# Patient Record
Sex: MALE | Race: WHITE | NOT HISPANIC OR LATINO | Employment: UNEMPLOYED | URBAN - NONMETROPOLITAN AREA
[De-identification: names, ages, dates, MRNs, and addresses within clinical notes are randomized per-mention and may not be internally consistent; named-entity substitution may affect disease eponyms.]

---

## 2020-06-22 ENCOUNTER — OFFICE VISIT (OUTPATIENT)
Dept: URGENT CARE | Facility: PHYSICIAN GROUP | Age: 35
End: 2020-06-22

## 2020-06-22 VITALS
DIASTOLIC BLOOD PRESSURE: 68 MMHG | TEMPERATURE: 99.1 F | HEIGHT: 75 IN | RESPIRATION RATE: 16 BRPM | WEIGHT: 152 LBS | SYSTOLIC BLOOD PRESSURE: 114 MMHG | BODY MASS INDEX: 18.9 KG/M2 | OXYGEN SATURATION: 98 % | HEART RATE: 72 BPM

## 2020-06-22 DIAGNOSIS — L02.11 ABSCESS OF NECK: ICD-10-CM

## 2020-06-22 PROCEDURE — 99203 OFFICE O/P NEW LOW 30 MIN: CPT | Performed by: NURSE PRACTITIONER

## 2020-06-22 ASSESSMENT — ENCOUNTER SYMPTOMS
SENSORY CHANGE: 0
VOMITING: 0
NAUSEA: 0
TINGLING: 0
CHILLS: 0
HEADACHES: 0
FEVER: 0

## 2020-06-22 NOTE — PROGRESS NOTES
Subjective:     Sudeep West  is a 34 y.o. male who presents for Cyst       Cyst   This is a new problem. The current episode started 1 to 4 weeks ago. The problem occurs constantly. The problem has been gradually worsening. Pertinent negatives include no chills, fever, headaches, nausea, rash or vomiting. Associated symptoms comments: 34-year-old male patient reports urgent care for new problem.  States that he is noticed a cyst on the back of his neck on the left-hand side getting worse over the last 30 days.  Patient states he has had slight pain due to the swelling but denies fever, chills, nausea or vomiting.  Patient states he did have this about 10 years ago but is unsure if it is in the same area or not.  Has not tried to pop it on its own or taken anything over-the-counter for symptoms.. Nothing aggravates the symptoms. He has tried nothing for the symptoms.     Review of Systems   Constitutional: Negative for chills, fever and malaise/fatigue.   Gastrointestinal: Negative for nausea and vomiting.   Skin: Negative for itching and rash.   Neurological: Negative for tingling, sensory change and headaches.     History reviewed. No pertinent past medical history. History reviewed. No pertinent surgical history.   Social History     Socioeconomic History   • Marital status: Single     Spouse name: Not on file   • Number of children: Not on file   • Years of education: Not on file   • Highest education level: Not on file   Occupational History   • Not on file   Social Needs   • Financial resource strain: Not on file   • Food insecurity     Worry: Not on file     Inability: Not on file   • Transportation needs     Medical: Not on file     Non-medical: Not on file   Tobacco Use   • Smoking status: Never Smoker   • Smokeless tobacco: Never Used   Substance and Sexual Activity   • Alcohol use: Not on file   • Drug use: Not on file   • Sexual activity: Not on file   Lifestyle   • Physical activity     Days per week:  "Not on file     Minutes per session: Not on file   • Stress: Not on file   Relationships   • Social connections     Talks on phone: Not on file     Gets together: Not on file     Attends Restorationist service: Not on file     Active member of club or organization: Not on file     Attends meetings of clubs or organizations: Not on file     Relationship status: Not on file   • Intimate partner violence     Fear of current or ex partner: Not on file     Emotionally abused: Not on file     Physically abused: Not on file     Forced sexual activity: Not on file   Other Topics Concern   • Not on file   Social History Narrative   • Not on file    Sulfa drugs     Objective:   /68 (BP Location: Right arm, Patient Position: Sitting, BP Cuff Size: Adult)   Pulse 72   Temp 37.3 °C (99.1 °F) (Temporal)   Resp 16   Ht 1.905 m (6' 3\")   Wt 68.9 kg (152 lb)   SpO2 98%   BMI 19.00 kg/m²   Physical Exam  Vitals signs reviewed.   Constitutional:       Appearance: Normal appearance.   Neck:     Cardiovascular:      Rate and Rhythm: Normal rate and regular rhythm.      Heart sounds: Normal heart sounds.   Pulmonary:      Effort: Pulmonary effort is normal.      Breath sounds: Normal breath sounds.   Neurological:      Mental Status: He is alert and oriented to person, place, and time.   Psychiatric:         Mood and Affect: Mood normal.         Behavior: Behavior normal.         Thought Content: Thought content normal.         Judgment: Judgment normal.          Assessment/Plan:     1. Abscess of neck    Procedure: Incision and Drainage  -Risks, benefits, and alternatives discussed. Risks include infection, bleeding, nerve damage, and poor cosmetic outcome  -Clean technique with sterile instruments  -Local anesthesia with 2% lidocaine with epinephrine  -Incision with #11 blade into fluctuant area with purulent material expressed  -Cavity probed and any loculations bluntly taken down with hemostat  -Irrigated copiously with " "sterile saline  -Packed with 1/4\" gauze, dressed with telfa and adaptic  -Minimal bleeding with good hemostasis achieved  -The patient tolerated the procedure well    Supportive care, differential diagnoses, and indications for immediate follow-up discussed with patient.    Pathogenesis of diagnosis discussed including typical length and natural progression. Patient expresses understanding and agrees to plan.    Instructed patient to return to clinic for worsening symptoms or symptoms that persist for 7 to 10 days     Please note that this dictation was created using voice recognition software. I have made every reasonable attempt to correct obvious errors, but I expect that there are errors of grammar and possibly content that I did not discover before finalizing the note.          "

## 2020-07-07 ENCOUNTER — OFFICE VISIT (OUTPATIENT)
Dept: URGENT CARE | Facility: PHYSICIAN GROUP | Age: 35
End: 2020-07-07

## 2020-07-07 VITALS
HEIGHT: 75 IN | BODY MASS INDEX: 18.9 KG/M2 | TEMPERATURE: 98.4 F | HEART RATE: 76 BPM | SYSTOLIC BLOOD PRESSURE: 116 MMHG | DIASTOLIC BLOOD PRESSURE: 80 MMHG | WEIGHT: 152 LBS | OXYGEN SATURATION: 98 %

## 2020-07-07 DIAGNOSIS — L72.9 INFECTED CYST OF SKIN: ICD-10-CM

## 2020-07-07 DIAGNOSIS — L08.9 INFECTED CYST OF SKIN: ICD-10-CM

## 2020-07-07 PROCEDURE — 99214 OFFICE O/P EST MOD 30 MIN: CPT | Performed by: PHYSICIAN ASSISTANT

## 2020-07-07 RX ORDER — DOXYCYCLINE HYCLATE 100 MG
100 TABLET ORAL 2 TIMES DAILY
Qty: 14 TAB | Refills: 0 | Status: SHIPPED | OUTPATIENT
Start: 2020-07-07 | End: 2020-07-14

## 2020-07-07 NOTE — PROGRESS NOTES
"Chief Complaint   Patient presents with   • Cyst     x was seen 06/22 / possible infection       HISTORY OF PRESENT ILLNESS: Patient is a 35 y.o. male who presents today for the following:    Patient is here for reevaluation of a cyst that was I&D 6/22.  He continues to have drainage that is malodorous.  He denies fever and pain.  He does report history of the same in the same location, approximately 10 years ago.    There are no active problems to display for this patient.      Allergies:Sulfa drugs    Current Outpatient Medications Ordered in Epic   Medication Sig Dispense Refill   • doxycycline (VIBRAMYCIN) 100 MG Tab Take 1 Tab by mouth 2 times a day for 7 days. 14 Tab 0     No current Epic-ordered facility-administered medications on file.        History reviewed. No pertinent past medical history.    Social History     Tobacco Use   • Smoking status: Never Smoker   • Smokeless tobacco: Never Used   Substance Use Topics   • Alcohol use: Not on file   • Drug use: Not on file       No family status information on file.   History reviewed. No pertinent family history.    Review of Systems:   Constitutional ROS: No unexpected change in weight, No weakness, No fatigue  Pulmonary ROS: No chronic cough, sputum, or hemoptysis, No dyspnea on exertion, No wheezing  Cardiovascular ROS: No diaphoresis, No edema, No palpitations  Hematologic/Lymphatic ROS: No chills, No night sweats, No weight loss  Skin/Integumentary ROS: Positive for cyst.      Exam:  /80   Pulse 76   Temp 36.9 °C (98.4 °F) (Temporal)   Ht 1.905 m (6' 3\")   Wt 68.9 kg (152 lb)   SpO2 98%   General: Well developed, well nourished. No distress.    HENT: Head is grossly normal.  Pulmonary: Unlabored respiratory effort.   Neurologic: Grossly nonfocal. No facial asymmetry noted.  Skin: Warm, dry, good turgor.  Incision still visible at the base of the neck, posterior aspect.  The area is nontender to palpation.  There is no erythema or drainage " noted.  Psych: Normal mood. Alert and oriented to person, place and time.    Assessment/Plan:  No signs of infection noted at this time.  Appears to be healing appropriately.  Provided patient with contingent antibiotics and discussed when to use these, trying to prevent repeat visit as patient does not have insurance.  1. Infected cyst of skin  doxycycline (VIBRAMYCIN) 100 MG Tab

## 2020-10-08 ENCOUNTER — OFFICE VISIT (OUTPATIENT)
Dept: MEDICAL GROUP | Facility: PHYSICIAN GROUP | Age: 35
End: 2020-10-08

## 2020-10-08 VITALS
WEIGHT: 143 LBS | BODY MASS INDEX: 17.78 KG/M2 | HEART RATE: 80 BPM | TEMPERATURE: 99 F | DIASTOLIC BLOOD PRESSURE: 60 MMHG | HEIGHT: 75 IN | OXYGEN SATURATION: 100 % | SYSTOLIC BLOOD PRESSURE: 98 MMHG

## 2020-10-08 DIAGNOSIS — R52 DIFFUSE PAIN: ICD-10-CM

## 2020-10-08 DIAGNOSIS — R93.5 ABNORMAL ABDOMINAL CT SCAN: ICD-10-CM

## 2020-10-08 DIAGNOSIS — R10.9 ABDOMINAL PAIN, UNSPECIFIED ABDOMINAL LOCATION: ICD-10-CM

## 2020-10-08 DIAGNOSIS — R11.0 NAUSEA: ICD-10-CM

## 2020-10-08 PROCEDURE — 99214 OFFICE O/P EST MOD 30 MIN: CPT | Performed by: NURSE PRACTITIONER

## 2020-10-08 RX ORDER — DICYCLOMINE HCL 20 MG
20 TABLET ORAL 2 TIMES DAILY
Qty: 60 TAB | Refills: 1 | Status: ON HOLD | OUTPATIENT
Start: 2020-10-08 | End: 2022-03-16

## 2020-10-08 RX ORDER — HYDROCORTISONE ACETATE 25 MG/1
25 SUPPOSITORY RECTAL EVERY 12 HOURS
Qty: 14 SUPPOSITORY | Refills: 0 | Status: ON HOLD | OUTPATIENT
Start: 2020-10-08 | End: 2022-03-16

## 2020-10-08 RX ORDER — ONDANSETRON 4 MG/1
4 TABLET, FILM COATED ORAL EVERY 4 HOURS PRN
Qty: 20 TAB | Refills: 1 | Status: SHIPPED | OUTPATIENT
Start: 2020-10-08 | End: 2022-03-18

## 2020-10-08 ASSESSMENT — PATIENT HEALTH QUESTIONNAIRE - PHQ9: CLINICAL INTERPRETATION OF PHQ2 SCORE: 0

## 2020-10-08 NOTE — PROGRESS NOTES
"CC: Establish care, referral to GI, abdominal pain    HISTORY OF THE PRESENT ILLNESS: Patient is a 35 y.o. male. This pleasant patient is here today for evaluation and management of the following health problems.      Abdominal pain  Patient is 35-year-old male here to establish care with me today.  He reports abdominal pain, nausea, unintentional weight loss, hemorrhoids.  Onset 6 to 8 weeks ago.  Has been seen in [facility] twice, most recently on 9/15/2020.  He brings in full records for me to review, will scanned into chart.  First ER visit showed moderate constipation on abdominal x-ray.  Patient was prescribed stool softener, laxative, Anusol.  Reports that medication caused him to be very nauseated and have diarrhea, discontinued after 3 days.  Never did fill Anusol prescription as it was too expensive.  Patient return to the ER on 9/15/2020 for worsening nausea triggered by abdominal pain, severe abdominal pain.  CT of abdomen showed \"nonspecific wall thickening of the left colon.  Could relate to colitis.\"  Patient was instructed to follow-up with Dr. Melo for colonoscopy.  Patient has been unable to schedule as he needs a referral from primary care provider.  Since ER visits, patient has been managing by avoiding trigger foods and eating small amounts.  He finds if he eats large amounts the abdominal pain becomes severe.  He describes abdominal pain as being in lower and lateral regions of abdomen.  Denies epigastric pain.  Does have small amount of blood in stool which he believes is from a hemorrhoid.  Labs done in ER showed normal CMP, CBC, UA.  Patient has lost 10 pounds in the last 2 months.    Diffuse pain  Patient is 35-year-old male here to establish care with me today.  Reports history of diffuse body pain.  Gets flares since in adulthood.  Reports has previously been worked up for autoimmune disease that was negative.      Allergies: Sulfa drugs    Current Outpatient Medications Ordered in " Epic   Medication Sig Dispense Refill   • ondansetron (ZOFRAN) 4 MG Tab tablet Take 1 Tab by mouth every four hours as needed for Nausea/Vomiting. 20 Tab 1   • dicyclomine (BENTYL) 20 MG Tab Take 1 Tab by mouth 2 Times a Day. 60 Tab 1   • hydrocortisone (ANUSOL-HC) 25 MG Suppos Insert 1 Suppository in rectum every 12 hours. 14 Suppository 0     No current [facility]-ordered facility-administered medications on file.        Past Medical History:   Diagnosis Date   • Anemia        Past Surgical History:   Procedure Laterality Date   • EYE SURGERY      lasik and muscle repair as child       Social History     Tobacco Use   • Smoking status: Never Smoker   • Smokeless tobacco: Never Used   Substance Use Topics   • Alcohol use: Not on file   • Drug use: Not on file       Family History   Problem Relation Age of Onset   • Lung Cancer Mother    • Lung Disease Father    • Abdominal aortic aneurysm Father    • Migraines Sister    • Seizures Sister        ROS:     - Constitutional: Negative for fever, chills.  Positive unexpected weight change, and fatigue/generalized weakness.     - HEENT: Negative for vision changes, hearing changes, ear pain, rhinorrhea, sinus congestion, and sore throat.   Positive headaches.    - Respiratory: Negative for cough, dyspnea, and wheezing.      - Cardiovascular: Negative for chest pain, palpitations, orthopnea, and bilateral lower extremity edema.     - Gastrointestinal: Negative for heartburn, nausea, vomiting, abdominal pain, diarrhea.     - Genitourinary: As in HPI, otherwise negative for dysuria, polyuria, hematuria, pyuria, urinary urgency, and urinary incontinence.    - Musculoskeletal: Negative for myalgias.  Positive back pain, and joint pain.     - Skin: Negative for rash, itching, cyanotic skin color change.     - Neurological: Negative for tingling,  focal sensory deficit, focal weakness and headaches.  Positive dizziness and tremors.    - Endo/Heme/Allergies: Does not bruise/bleed  "easily.     - Psychiatric/Behavioral: Negative for depression, suicidal/homicidal ideation and memory loss.           Exam: BP (!) 98/60   Pulse 80   Temp 37.2 °C (99 °F) (Temporal)   Ht 1.905 m (6' 3\")   Wt 64.9 kg (143 lb)   SpO2 100%  Body mass index is 17.87 kg/m².    General: Alert, pleasant, ill-appearing, thin, male in NAD  HEENT: Normocephalic. Eyes conjunctiva clear lids without ptosis, pupils equal and reactive to light, ears normal shape and contour, canals are clear bilaterally, tympanic membranes are pearly gray with good light reflex, nasal mucosa without erythema and drainage, oropharynx is without erythema, edema or exudates.   Neck: Supple without bruit. Thyroid is not enlarged.  Pulmonary: Clear to ausculation.  Normal effort. No rales, ronchi, or wheezing.  Cardiovascular: Normal rate and rhythm without murmur. Carotid and radial pulses are intact and equal bilaterally.  No lower extremity edema.  Abdomen: Soft, moderate diffuse tenderness throughout, nondistended. Normal bowel sounds. Liver and spleen are not palpable.  Neurologic: Grossly nonfocal  Lymph: No cervical or supraclavicular lymph nodes are palpable  Skin: Warm and dry.  Pale.  Musculoskeletal: Normal gait.   Psych: Normal mood and affect. Alert and oriented. Judgment and insight is normal.    Please note that this dictation was created using voice recognition software. I have made every reasonable attempt to correct obvious errors, but I expect that there are errors of grammar and possibly content that I did not discover before finalizing the note.      Assessment/Plan  1. Nausea  Advised patient to use ondansetron sparingly only for severe nausea.  - ondansetron (ZOFRAN) 4 MG Tab tablet; Take 1 Tab by mouth every four hours as needed for Nausea/Vomiting.  Dispense: 20 Tab; Refill: 1    2. Abdominal pain, unspecified abdominal location  Patient continues to have abdominal pain, unintentional weight loss.  Likely has inflammatory " bowel disease, but may have irritable bowel syndrome.  Will refer to gastroenterology urgently.  In the meantime patient will trial dicyclomine to see if this helps with abdominal cramping.  Strict ER precautions reviewed with patient.  - REFERRAL TO GASTROENTEROLOGY  - dicyclomine (BENTYL) 20 MG Tab; Take 1 Tab by mouth 2 Times a Day.  Dispense: 60 Tab; Refill: 1    3. Abnormal abdominal CT scan    - REFERRAL TO GASTROENTEROLOGY    4. Diffuse pain  Continue to monitor.    Patient will return to clinic in the next few months.  Patient does have Medicaid insurance and has to pay cash when he comes to see me.  I did advise that we do have primary care providers in [place] in [place] who take Medicaid.  Patient will consider.

## 2020-10-09 PROBLEM — R52 DIFFUSE PAIN: Status: ACTIVE | Noted: 2020-10-09

## 2020-10-09 PROBLEM — R10.9 ABDOMINAL PAIN: Status: ACTIVE | Noted: 2020-10-09

## 2020-10-09 SDOH — HEALTH STABILITY: MENTAL HEALTH: HOW OFTEN DO YOU HAVE A DRINK CONTAINING ALCOHOL?: NEVER

## 2020-10-09 SDOH — HEALTH STABILITY: MENTAL HEALTH: HOW OFTEN DO YOU HAVE 6 OR MORE DRINKS ON ONE OCCASION?: NEVER

## 2020-10-09 NOTE — ASSESSMENT & PLAN NOTE
Patient is 35-year-old male here to establish care with me today.  Reports history of diffuse body pain.  Gets flares since in adulthood.  Reports has previously been worked up for autoimmune disease that was negative.

## 2021-11-29 DIAGNOSIS — R11.0 NAUSEA: ICD-10-CM

## 2021-12-01 RX ORDER — ONDANSETRON 4 MG/1
TABLET, FILM COATED ORAL
Qty: 4 TABLET | Refills: 0 | OUTPATIENT
Start: 2021-12-01

## 2022-03-11 ENCOUNTER — PRE-ADMISSION TESTING (OUTPATIENT)
Dept: ADMISSIONS | Facility: MEDICAL CENTER | Age: 37
End: 2022-03-11
Attending: COLON & RECTAL SURGERY
Payer: MEDICAID

## 2022-03-11 RX ORDER — HYOSCYAMINE SULFATE 0.125 MG
125 TABLET ORAL EVERY 4 HOURS PRN
COMMUNITY

## 2022-03-11 RX ORDER — OMEPRAZOLE 20 MG/1
20 CAPSULE, DELAYED RELEASE ORAL DAILY
COMMUNITY

## 2022-03-11 RX ORDER — GABAPENTIN 300 MG/1
300 CAPSULE ORAL
COMMUNITY

## 2022-03-14 ENCOUNTER — APPOINTMENT (OUTPATIENT)
Dept: ADMISSIONS | Facility: MEDICAL CENTER | Age: 37
End: 2022-03-14
Payer: MEDICAID

## 2022-03-16 ENCOUNTER — ANESTHESIA EVENT (OUTPATIENT)
Dept: SURGERY | Facility: MEDICAL CENTER | Age: 37
End: 2022-03-16
Payer: MEDICAID

## 2022-03-16 ENCOUNTER — ANESTHESIA (OUTPATIENT)
Dept: SURGERY | Facility: MEDICAL CENTER | Age: 37
End: 2022-03-16
Payer: MEDICAID

## 2022-03-16 ENCOUNTER — HOSPITAL ENCOUNTER (OUTPATIENT)
Facility: MEDICAL CENTER | Age: 37
End: 2022-03-17
Attending: COLON & RECTAL SURGERY | Admitting: COLON & RECTAL SURGERY
Payer: MEDICAID

## 2022-03-16 DIAGNOSIS — R11.0 NAUSEA: ICD-10-CM

## 2022-03-16 DIAGNOSIS — G89.18 POSTOPERATIVE PAIN: ICD-10-CM

## 2022-03-16 PROBLEM — K44.9 HIATAL HERNIA: Status: ACTIVE | Noted: 2022-03-16

## 2022-03-16 LAB
ANION GAP SERPL CALC-SCNC: 10 MMOL/L (ref 7–16)
BUN SERPL-MCNC: 11 MG/DL (ref 8–22)
CALCIUM SERPL-MCNC: 9.9 MG/DL (ref 8.5–10.5)
CHLORIDE SERPL-SCNC: 103 MMOL/L (ref 96–112)
CO2 SERPL-SCNC: 26 MMOL/L (ref 20–33)
CREAT SERPL-MCNC: 0.82 MG/DL (ref 0.5–1.4)
ERYTHROCYTE [DISTWIDTH] IN BLOOD BY AUTOMATED COUNT: 38.6 FL (ref 35.9–50)
EXTERNAL QUALITY CONTROL: NORMAL
GFR SERPLBLD CREATININE-BSD FMLA CKD-EPI: 117 ML/MIN/1.73 M 2
GLUCOSE SERPL-MCNC: 100 MG/DL (ref 65–99)
HCT VFR BLD AUTO: 41.2 % (ref 42–52)
HGB BLD-MCNC: 14 G/DL (ref 14–18)
MCH RBC QN AUTO: 30.3 PG (ref 27–33)
MCHC RBC AUTO-ENTMCNC: 34 G/DL (ref 33.7–35.3)
MCV RBC AUTO: 89.2 FL (ref 81.4–97.8)
PLATELET # BLD AUTO: 274 K/UL (ref 164–446)
PMV BLD AUTO: 9 FL (ref 9–12.9)
POTASSIUM SERPL-SCNC: 4 MMOL/L (ref 3.6–5.5)
RBC # BLD AUTO: 4.62 M/UL (ref 4.7–6.1)
SARS-COV+SARS-COV-2 AG RESP QL IA.RAPID: NEGATIVE
SODIUM SERPL-SCNC: 139 MMOL/L (ref 135–145)
WBC # BLD AUTO: 8 K/UL (ref 4.8–10.8)

## 2022-03-16 PROCEDURE — 501570 HCHG TROCAR, SEPARATOR: Performed by: COLON & RECTAL SURGERY

## 2022-03-16 PROCEDURE — 700111 HCHG RX REV CODE 636 W/ 250 OVERRIDE (IP): Performed by: STUDENT IN AN ORGANIZED HEALTH CARE EDUCATION/TRAINING PROGRAM

## 2022-03-16 PROCEDURE — 502000 HCHG MISC OR IMPLANTS RC 0278: Performed by: COLON & RECTAL SURGERY

## 2022-03-16 PROCEDURE — 700101 HCHG RX REV CODE 250: Performed by: PHYSICIAN ASSISTANT

## 2022-03-16 PROCEDURE — 501571 HCHG TROCAR, SEPARATOR 12X100: Performed by: COLON & RECTAL SURGERY

## 2022-03-16 PROCEDURE — 500522 HCHG ENDOSTITCH SUTURING DEVICE: Performed by: COLON & RECTAL SURGERY

## 2022-03-16 PROCEDURE — A9270 NON-COVERED ITEM OR SERVICE: HCPCS | Performed by: PHYSICIAN ASSISTANT

## 2022-03-16 PROCEDURE — 700111 HCHG RX REV CODE 636 W/ 250 OVERRIDE (IP): Performed by: PHYSICIAN ASSISTANT

## 2022-03-16 PROCEDURE — 96375 TX/PRO/DX INJ NEW DRUG ADDON: CPT | Mod: XU

## 2022-03-16 PROCEDURE — 700101 HCHG RX REV CODE 250: Performed by: STUDENT IN AN ORGANIZED HEALTH CARE EDUCATION/TRAINING PROGRAM

## 2022-03-16 PROCEDURE — 160002 HCHG RECOVERY MINUTES (STAT): Performed by: COLON & RECTAL SURGERY

## 2022-03-16 PROCEDURE — G0378 HOSPITAL OBSERVATION PER HR: HCPCS

## 2022-03-16 PROCEDURE — 700102 HCHG RX REV CODE 250 W/ 637 OVERRIDE(OP): Performed by: STUDENT IN AN ORGANIZED HEALTH CARE EDUCATION/TRAINING PROGRAM

## 2022-03-16 PROCEDURE — 87426 SARSCOV CORONAVIRUS AG IA: CPT | Performed by: COLON & RECTAL SURGERY

## 2022-03-16 PROCEDURE — 700105 HCHG RX REV CODE 258: Performed by: COLON & RECTAL SURGERY

## 2022-03-16 PROCEDURE — 500521 HCHG ENDOSTITCH LOAD UNIT: Performed by: COLON & RECTAL SURGERY

## 2022-03-16 PROCEDURE — 700102 HCHG RX REV CODE 250 W/ 637 OVERRIDE(OP): Performed by: PHYSICIAN ASSISTANT

## 2022-03-16 PROCEDURE — 96374 THER/PROPH/DIAG INJ IV PUSH: CPT | Mod: XU

## 2022-03-16 PROCEDURE — 160036 HCHG PACU - EA ADDL 30 MINS PHASE I: Performed by: COLON & RECTAL SURGERY

## 2022-03-16 PROCEDURE — 160041 HCHG SURGERY MINUTES - EA ADDL 1 MIN LEVEL 4: Performed by: COLON & RECTAL SURGERY

## 2022-03-16 PROCEDURE — 501838 HCHG SUTURE GENERAL: Performed by: COLON & RECTAL SURGERY

## 2022-03-16 PROCEDURE — 36415 COLL VENOUS BLD VENIPUNCTURE: CPT

## 2022-03-16 PROCEDURE — 160048 HCHG OR STATISTICAL LEVEL 1-5: Performed by: COLON & RECTAL SURGERY

## 2022-03-16 PROCEDURE — C1781 MESH (IMPLANTABLE): HCPCS | Performed by: COLON & RECTAL SURGERY

## 2022-03-16 PROCEDURE — 160035 HCHG PACU - 1ST 60 MINS PHASE I: Performed by: COLON & RECTAL SURGERY

## 2022-03-16 PROCEDURE — 85027 COMPLETE CBC AUTOMATED: CPT

## 2022-03-16 PROCEDURE — 160029 HCHG SURGERY MINUTES - 1ST 30 MINS LEVEL 4: Performed by: COLON & RECTAL SURGERY

## 2022-03-16 PROCEDURE — 80048 BASIC METABOLIC PNL TOTAL CA: CPT

## 2022-03-16 PROCEDURE — 700101 HCHG RX REV CODE 250: Performed by: COLON & RECTAL SURGERY

## 2022-03-16 PROCEDURE — 502240 HCHG MISC OR SUPPLY RC 0272: Performed by: COLON & RECTAL SURGERY

## 2022-03-16 PROCEDURE — A9270 NON-COVERED ITEM OR SERVICE: HCPCS | Performed by: STUDENT IN AN ORGANIZED HEALTH CARE EDUCATION/TRAINING PROGRAM

## 2022-03-16 PROCEDURE — 501497 HCHG SURGICLIP: Performed by: COLON & RECTAL SURGERY

## 2022-03-16 PROCEDURE — 501583 HCHG TROCAR, THRD CAN&SEAL 5X100: Performed by: COLON & RECTAL SURGERY

## 2022-03-16 PROCEDURE — 96376 TX/PRO/DX INJ SAME DRUG ADON: CPT

## 2022-03-16 PROCEDURE — 502570 HCHG PACK, GASTRIC BANDING: Performed by: COLON & RECTAL SURGERY

## 2022-03-16 PROCEDURE — 160009 HCHG ANES TIME/MIN: Performed by: COLON & RECTAL SURGERY

## 2022-03-16 DEVICE — MESH OVITEX 6 X 10CM RESORBABLE (1/EA): Type: IMPLANTABLE DEVICE | Site: ABDOMEN | Status: FUNCTIONAL

## 2022-03-16 RX ORDER — HYDROMORPHONE HYDROCHLORIDE 1 MG/ML
0.1 INJECTION, SOLUTION INTRAMUSCULAR; INTRAVENOUS; SUBCUTANEOUS
Status: DISCONTINUED | OUTPATIENT
Start: 2022-03-16 | End: 2022-03-16 | Stop reason: HOSPADM

## 2022-03-16 RX ORDER — HYDROMORPHONE HYDROCHLORIDE 1 MG/ML
0.4 INJECTION, SOLUTION INTRAMUSCULAR; INTRAVENOUS; SUBCUTANEOUS
Status: DISCONTINUED | OUTPATIENT
Start: 2022-03-16 | End: 2022-03-16 | Stop reason: HOSPADM

## 2022-03-16 RX ORDER — LIDOCAINE HYDROCHLORIDE 20 MG/ML
INJECTION, SOLUTION EPIDURAL; INFILTRATION; INTRACAUDAL; PERINEURAL PRN
Status: DISCONTINUED | OUTPATIENT
Start: 2022-03-16 | End: 2022-03-16 | Stop reason: SURG

## 2022-03-16 RX ORDER — ACETAMINOPHEN 500 MG
1000 TABLET ORAL EVERY 6 HOURS
Status: DISCONTINUED | OUTPATIENT
Start: 2022-03-16 | End: 2022-03-17 | Stop reason: HOSPADM

## 2022-03-16 RX ORDER — CEFAZOLIN SODIUM 1 G/3ML
INJECTION, POWDER, FOR SOLUTION INTRAMUSCULAR; INTRAVENOUS PRN
Status: DISCONTINUED | OUTPATIENT
Start: 2022-03-16 | End: 2022-03-16 | Stop reason: SURG

## 2022-03-16 RX ORDER — HYDROMORPHONE HYDROCHLORIDE 1 MG/ML
0.2 INJECTION, SOLUTION INTRAMUSCULAR; INTRAVENOUS; SUBCUTANEOUS
Status: DISCONTINUED | OUTPATIENT
Start: 2022-03-16 | End: 2022-03-16 | Stop reason: HOSPADM

## 2022-03-16 RX ORDER — HYDRALAZINE HYDROCHLORIDE 20 MG/ML
5 INJECTION INTRAMUSCULAR; INTRAVENOUS
Status: DISCONTINUED | OUTPATIENT
Start: 2022-03-16 | End: 2022-03-16 | Stop reason: HOSPADM

## 2022-03-16 RX ORDER — DIPHENHYDRAMINE HYDROCHLORIDE 50 MG/ML
25 INJECTION INTRAMUSCULAR; INTRAVENOUS EVERY 6 HOURS PRN
Status: DISCONTINUED | OUTPATIENT
Start: 2022-03-16 | End: 2022-03-17 | Stop reason: HOSPADM

## 2022-03-16 RX ORDER — MIDAZOLAM HYDROCHLORIDE 1 MG/ML
1 INJECTION INTRAMUSCULAR; INTRAVENOUS
Status: DISCONTINUED | OUTPATIENT
Start: 2022-03-16 | End: 2022-03-16 | Stop reason: HOSPADM

## 2022-03-16 RX ORDER — SODIUM CHLORIDE, SODIUM LACTATE, POTASSIUM CHLORIDE, AND CALCIUM CHLORIDE .6; .31; .03; .02 G/100ML; G/100ML; G/100ML; G/100ML
500 INJECTION, SOLUTION INTRAVENOUS
Status: DISCONTINUED | OUTPATIENT
Start: 2022-03-16 | End: 2022-03-17 | Stop reason: HOSPADM

## 2022-03-16 RX ORDER — OXYCODONE HCL 5 MG/5 ML
5 SOLUTION, ORAL ORAL
Status: DISCONTINUED | OUTPATIENT
Start: 2022-03-16 | End: 2022-03-17 | Stop reason: HOSPADM

## 2022-03-16 RX ORDER — HYDROMORPHONE HYDROCHLORIDE 1 MG/ML
0.5 INJECTION, SOLUTION INTRAMUSCULAR; INTRAVENOUS; SUBCUTANEOUS
Status: DISCONTINUED | OUTPATIENT
Start: 2022-03-16 | End: 2022-03-17 | Stop reason: HOSPADM

## 2022-03-16 RX ORDER — SCOLOPAMINE TRANSDERMAL SYSTEM 1 MG/1
PATCH, EXTENDED RELEASE TRANSDERMAL
Status: DISPENSED
Start: 2022-03-16 | End: 2022-03-16

## 2022-03-16 RX ORDER — ENALAPRILAT 1.25 MG/ML
2.5 INJECTION INTRAVENOUS EVERY 6 HOURS PRN
Status: DISCONTINUED | OUTPATIENT
Start: 2022-03-16 | End: 2022-03-17 | Stop reason: HOSPADM

## 2022-03-16 RX ORDER — ESMOLOL HYDROCHLORIDE 10 MG/ML
INJECTION INTRAVENOUS PRN
Status: DISCONTINUED | OUTPATIENT
Start: 2022-03-16 | End: 2022-03-16 | Stop reason: SURG

## 2022-03-16 RX ORDER — CALCIUM CARBONATE 500 MG/1
500 TABLET, CHEWABLE ORAL
Status: DISCONTINUED | OUTPATIENT
Start: 2022-03-16 | End: 2022-03-17 | Stop reason: HOSPADM

## 2022-03-16 RX ORDER — MEPERIDINE HYDROCHLORIDE 25 MG/ML
12.5 INJECTION INTRAMUSCULAR; INTRAVENOUS; SUBCUTANEOUS
Status: DISCONTINUED | OUTPATIENT
Start: 2022-03-16 | End: 2022-03-16 | Stop reason: HOSPADM

## 2022-03-16 RX ORDER — OXYCODONE HCL 5 MG/5 ML
5 SOLUTION, ORAL ORAL
Status: COMPLETED | OUTPATIENT
Start: 2022-03-16 | End: 2022-03-16

## 2022-03-16 RX ORDER — ONDANSETRON 2 MG/ML
4 INJECTION INTRAMUSCULAR; INTRAVENOUS
Status: DISCONTINUED | OUTPATIENT
Start: 2022-03-16 | End: 2022-03-16 | Stop reason: HOSPADM

## 2022-03-16 RX ORDER — PROMETHAZINE HYDROCHLORIDE 25 MG/1
25 SUPPOSITORY RECTAL EVERY 4 HOURS PRN
Status: DISCONTINUED | OUTPATIENT
Start: 2022-03-16 | End: 2022-03-17 | Stop reason: HOSPADM

## 2022-03-16 RX ORDER — LIDOCAINE HYDROCHLORIDE 40 MG/ML
SOLUTION TOPICAL PRN
Status: DISCONTINUED | OUTPATIENT
Start: 2022-03-16 | End: 2022-03-16 | Stop reason: SURG

## 2022-03-16 RX ORDER — METOPROLOL TARTRATE 1 MG/ML
1 INJECTION, SOLUTION INTRAVENOUS
Status: DISCONTINUED | OUTPATIENT
Start: 2022-03-16 | End: 2022-03-16 | Stop reason: HOSPADM

## 2022-03-16 RX ORDER — DIPHENHYDRAMINE HYDROCHLORIDE 50 MG/ML
12.5 INJECTION INTRAMUSCULAR; INTRAVENOUS EVERY 6 HOURS PRN
Status: DISCONTINUED | OUTPATIENT
Start: 2022-03-16 | End: 2022-03-17 | Stop reason: HOSPADM

## 2022-03-16 RX ORDER — HALOPERIDOL 5 MG/ML
1 INJECTION INTRAMUSCULAR
Status: DISCONTINUED | OUTPATIENT
Start: 2022-03-16 | End: 2022-03-16 | Stop reason: HOSPADM

## 2022-03-16 RX ORDER — SODIUM CHLORIDE AND POTASSIUM CHLORIDE 150; 900 MG/100ML; MG/100ML
INJECTION, SOLUTION INTRAVENOUS CONTINUOUS
Status: DISCONTINUED | OUTPATIENT
Start: 2022-03-16 | End: 2022-03-17 | Stop reason: HOSPADM

## 2022-03-16 RX ORDER — OXYCODONE HCL 5 MG/5 ML
10 SOLUTION, ORAL ORAL
Status: COMPLETED | OUTPATIENT
Start: 2022-03-16 | End: 2022-03-16

## 2022-03-16 RX ORDER — OXYCODONE HCL 5 MG/5 ML
10 SOLUTION, ORAL ORAL
Status: DISCONTINUED | OUTPATIENT
Start: 2022-03-16 | End: 2022-03-17 | Stop reason: HOSPADM

## 2022-03-16 RX ORDER — DIPHENHYDRAMINE HCL 25 MG
25 TABLET ORAL EVERY 6 HOURS PRN
Status: DISCONTINUED | OUTPATIENT
Start: 2022-03-16 | End: 2022-03-17 | Stop reason: HOSPADM

## 2022-03-16 RX ORDER — ACETAMINOPHEN 500 MG
1000 TABLET ORAL EVERY 6 HOURS PRN
Status: DISCONTINUED | OUTPATIENT
Start: 2022-03-21 | End: 2022-03-17 | Stop reason: HOSPADM

## 2022-03-16 RX ORDER — HALOPERIDOL 5 MG/ML
1 INJECTION INTRAMUSCULAR EVERY 6 HOURS PRN
Status: DISCONTINUED | OUTPATIENT
Start: 2022-03-16 | End: 2022-03-17 | Stop reason: HOSPADM

## 2022-03-16 RX ORDER — LABETALOL HYDROCHLORIDE 5 MG/ML
5 INJECTION, SOLUTION INTRAVENOUS
Status: DISCONTINUED | OUTPATIENT
Start: 2022-03-16 | End: 2022-03-16 | Stop reason: HOSPADM

## 2022-03-16 RX ORDER — SODIUM CHLORIDE, SODIUM LACTATE, POTASSIUM CHLORIDE, CALCIUM CHLORIDE 600; 310; 30; 20 MG/100ML; MG/100ML; MG/100ML; MG/100ML
INJECTION, SOLUTION INTRAVENOUS CONTINUOUS
Status: ACTIVE | OUTPATIENT
Start: 2022-03-16 | End: 2022-03-16

## 2022-03-16 RX ORDER — BUPIVACAINE HYDROCHLORIDE AND EPINEPHRINE 5; 5 MG/ML; UG/ML
INJECTION, SOLUTION EPIDURAL; INTRACAUDAL; PERINEURAL
Status: DISCONTINUED | OUTPATIENT
Start: 2022-03-16 | End: 2022-03-16 | Stop reason: HOSPADM

## 2022-03-16 RX ORDER — ONDANSETRON 2 MG/ML
4 INJECTION INTRAMUSCULAR; INTRAVENOUS EVERY 4 HOURS PRN
Status: DISCONTINUED | OUTPATIENT
Start: 2022-03-16 | End: 2022-03-17 | Stop reason: HOSPADM

## 2022-03-16 RX ORDER — DEXAMETHASONE SODIUM PHOSPHATE 4 MG/ML
INJECTION, SOLUTION INTRA-ARTICULAR; INTRALESIONAL; INTRAMUSCULAR; INTRAVENOUS; SOFT TISSUE PRN
Status: DISCONTINUED | OUTPATIENT
Start: 2022-03-16 | End: 2022-03-16 | Stop reason: SURG

## 2022-03-16 RX ORDER — METOCLOPRAMIDE HYDROCHLORIDE 5 MG/ML
INJECTION INTRAMUSCULAR; INTRAVENOUS PRN
Status: DISCONTINUED | OUTPATIENT
Start: 2022-03-16 | End: 2022-03-16 | Stop reason: SURG

## 2022-03-16 RX ORDER — HYOSCYAMINE SULFATE 0.12 MG/5ML
0.12 LIQUID ORAL EVERY 4 HOURS PRN
Status: DISCONTINUED | OUTPATIENT
Start: 2022-03-16 | End: 2022-03-17 | Stop reason: HOSPADM

## 2022-03-16 RX ORDER — SIMETHICONE 125 MG
125 TABLET,CHEWABLE ORAL 3 TIMES DAILY PRN
Status: DISCONTINUED | OUTPATIENT
Start: 2022-03-16 | End: 2022-03-17 | Stop reason: HOSPADM

## 2022-03-16 RX ORDER — HYDROMORPHONE HYDROCHLORIDE 2 MG/ML
INJECTION, SOLUTION INTRAMUSCULAR; INTRAVENOUS; SUBCUTANEOUS PRN
Status: DISCONTINUED | OUTPATIENT
Start: 2022-03-16 | End: 2022-03-16 | Stop reason: SURG

## 2022-03-16 RX ORDER — DIPHENHYDRAMINE HYDROCHLORIDE 50 MG/ML
12.5 INJECTION INTRAMUSCULAR; INTRAVENOUS
Status: DISCONTINUED | OUTPATIENT
Start: 2022-03-16 | End: 2022-03-16 | Stop reason: HOSPADM

## 2022-03-16 RX ORDER — GABAPENTIN 300 MG/1
300 CAPSULE ORAL
Status: DISCONTINUED | OUTPATIENT
Start: 2022-03-16 | End: 2022-03-17 | Stop reason: HOSPADM

## 2022-03-16 RX ORDER — ONDANSETRON 2 MG/ML
INJECTION INTRAMUSCULAR; INTRAVENOUS PRN
Status: DISCONTINUED | OUTPATIENT
Start: 2022-03-16 | End: 2022-03-16 | Stop reason: SURG

## 2022-03-16 RX ADMIN — HYDROMORPHONE HYDROCHLORIDE 0.5 MG: 1 INJECTION, SOLUTION INTRAMUSCULAR; INTRAVENOUS; SUBCUTANEOUS at 20:32

## 2022-03-16 RX ADMIN — HYDROMORPHONE HYDROCHLORIDE 0.2 MG: 1 INJECTION, SOLUTION INTRAMUSCULAR; INTRAVENOUS; SUBCUTANEOUS at 12:53

## 2022-03-16 RX ADMIN — FENTANYL CITRATE 100 MCG: 50 INJECTION, SOLUTION INTRAMUSCULAR; INTRAVENOUS at 11:35

## 2022-03-16 RX ADMIN — LIDOCAINE HYDROCHLORIDE 4 ML: 40 SOLUTION TOPICAL at 11:37

## 2022-03-16 RX ADMIN — FENTANYL CITRATE 50 MCG: 50 INJECTION INTRAMUSCULAR; INTRAVENOUS at 12:46

## 2022-03-16 RX ADMIN — HYDROMORPHONE HYDROCHLORIDE 0.5 MG: 1 INJECTION, SOLUTION INTRAMUSCULAR; INTRAVENOUS; SUBCUTANEOUS at 14:10

## 2022-03-16 RX ADMIN — OXYCODONE HYDROCHLORIDE 10 MG: 5 SOLUTION ORAL at 12:48

## 2022-03-16 RX ADMIN — ESMOLOL HYDROCHLORIDE 20 MG: 100 INJECTION, SOLUTION INTRAVENOUS at 11:40

## 2022-03-16 RX ADMIN — METOCLOPRAMIDE 10 MG: 5 INJECTION, SOLUTION INTRAMUSCULAR; INTRAVENOUS at 11:39

## 2022-03-16 RX ADMIN — HYDROMORPHONE HYDROCHLORIDE 0.4 MG: 2 INJECTION INTRAMUSCULAR; INTRAVENOUS; SUBCUTANEOUS at 12:00

## 2022-03-16 RX ADMIN — FAMOTIDINE 20 MG: 10 INJECTION INTRAVENOUS at 17:29

## 2022-03-16 RX ADMIN — HYDROMORPHONE HYDROCHLORIDE 0.2 MG: 1 INJECTION, SOLUTION INTRAMUSCULAR; INTRAVENOUS; SUBCUTANEOUS at 13:00

## 2022-03-16 RX ADMIN — ACETAMINOPHEN 1000 MG: 500 TABLET ORAL at 23:42

## 2022-03-16 RX ADMIN — OXYCODONE HYDROCHLORIDE 10 MG: 5 SOLUTION ORAL at 23:42

## 2022-03-16 RX ADMIN — ONDANSETRON 4 MG: 2 INJECTION INTRAMUSCULAR; INTRAVENOUS at 14:11

## 2022-03-16 RX ADMIN — CEFAZOLIN 2 G: 330 INJECTION, POWDER, FOR SOLUTION INTRAMUSCULAR; INTRAVENOUS at 11:38

## 2022-03-16 RX ADMIN — FENTANYL CITRATE 50 MCG: 50 INJECTION INTRAMUSCULAR; INTRAVENOUS at 12:52

## 2022-03-16 RX ADMIN — LIDOCAINE HYDROCHLORIDE 60 MG: 20 INJECTION, SOLUTION EPIDURAL; INFILTRATION; INTRACAUDAL at 11:37

## 2022-03-16 RX ADMIN — ROCURONIUM BROMIDE 50 MG: 10 INJECTION, SOLUTION INTRAVENOUS at 11:37

## 2022-03-16 RX ADMIN — ONDANSETRON 4 MG: 2 INJECTION INTRAMUSCULAR; INTRAVENOUS at 11:49

## 2022-03-16 RX ADMIN — DIPHENHYDRAMINE HYDROCHLORIDE 25 MG: 50 INJECTION INTRAMUSCULAR; INTRAVENOUS at 19:33

## 2022-03-16 RX ADMIN — DEXAMETHASONE SODIUM PHOSPHATE 4 MG: 4 INJECTION, SOLUTION INTRA-ARTICULAR; INTRALESIONAL; INTRAMUSCULAR; INTRAVENOUS; SOFT TISSUE at 11:40

## 2022-03-16 RX ADMIN — HALOPERIDOL LACTATE 1 MG: 5 INJECTION, SOLUTION INTRAMUSCULAR at 12:34

## 2022-03-16 RX ADMIN — MEPERIDINE HYDROCHLORIDE 12.5 MG: 25 INJECTION INTRAMUSCULAR; INTRAVENOUS; SUBCUTANEOUS at 12:30

## 2022-03-16 RX ADMIN — POTASSIUM CHLORIDE AND SODIUM CHLORIDE: 900; 150 INJECTION, SOLUTION INTRAVENOUS at 14:11

## 2022-03-16 RX ADMIN — GABAPENTIN 300 MG: 300 CAPSULE ORAL at 21:03

## 2022-03-16 RX ADMIN — ACETAMINOPHEN 1000 MG: 500 TABLET ORAL at 14:10

## 2022-03-16 RX ADMIN — MIDAZOLAM 2 MG: 1 INJECTION INTRAMUSCULAR; INTRAVENOUS at 11:33

## 2022-03-16 RX ADMIN — SUGAMMADEX 200 MG: 100 INJECTION, SOLUTION INTRAVENOUS at 12:14

## 2022-03-16 RX ADMIN — SODIUM CHLORIDE, POTASSIUM CHLORIDE, SODIUM LACTATE AND CALCIUM CHLORIDE: 600; 310; 30; 20 INJECTION, SOLUTION INTRAVENOUS at 10:28

## 2022-03-16 RX ADMIN — HYDROMORPHONE HYDROCHLORIDE 0.5 MG: 1 INJECTION, SOLUTION INTRAMUSCULAR; INTRAVENOUS; SUBCUTANEOUS at 17:29

## 2022-03-16 ASSESSMENT — LIFESTYLE VARIABLES
AVERAGE NUMBER OF DAYS PER WEEK YOU HAVE A DRINK CONTAINING ALCOHOL: 0
HAVE YOU EVER FELT YOU SHOULD CUT DOWN ON YOUR DRINKING: NO
CONSUMPTION TOTAL: NEGATIVE
TOTAL SCORE: 0
TOTAL SCORE: 0
ALCOHOL_USE: NO
HAVE PEOPLE ANNOYED YOU BY CRITICIZING YOUR DRINKING: NO
EVER HAD A DRINK FIRST THING IN THE MORNING TO STEADY YOUR NERVES TO GET RID OF A HANGOVER: NO
ON A TYPICAL DAY WHEN YOU DRINK ALCOHOL HOW MANY DRINKS DO YOU HAVE: 0
HOW MANY TIMES IN THE PAST YEAR HAVE YOU HAD 5 OR MORE DRINKS IN A DAY: 0
TOTAL SCORE: 0
EVER FELT BAD OR GUILTY ABOUT YOUR DRINKING: NO

## 2022-03-16 ASSESSMENT — COGNITIVE AND FUNCTIONAL STATUS - GENERAL
STANDING UP FROM CHAIR USING ARMS: A LITTLE
DAILY ACTIVITIY SCORE: 23
SUGGESTED CMS G CODE MODIFIER DAILY ACTIVITY: CI
MOVING FROM LYING ON BACK TO SITTING ON SIDE OF FLAT BED: A LITTLE
MOBILITY SCORE: 19
SUGGESTED CMS G CODE MODIFIER MOBILITY: CK
DRESSING REGULAR LOWER BODY CLOTHING: A LITTLE
CLIMB 3 TO 5 STEPS WITH RAILING: A LITTLE
MOVING TO AND FROM BED TO CHAIR: A LITTLE
WALKING IN HOSPITAL ROOM: A LITTLE

## 2022-03-16 ASSESSMENT — PATIENT HEALTH QUESTIONNAIRE - PHQ9
2. FEELING DOWN, DEPRESSED, IRRITABLE, OR HOPELESS: SEVERAL DAYS
6. FEELING BAD ABOUT YOURSELF - OR THAT YOU ARE A FAILURE OR HAVE LET YOURSELF OR YOUR FAMILY DOWN: NOT AL ALL
9. THOUGHTS THAT YOU WOULD BE BETTER OFF DEAD, OR OF HURTING YOURSELF: NOT AT ALL
1. LITTLE INTEREST OR PLEASURE IN DOING THINGS: SEVERAL DAYS
SUM OF ALL RESPONSES TO PHQ9 QUESTIONS 1 AND 2: 2
SUM OF ALL RESPONSES TO PHQ QUESTIONS 1-9: 5
7. TROUBLE CONCENTRATING ON THINGS, SUCH AS READING THE NEWSPAPER OR WATCHING TELEVISION: SEVERAL DAYS
5. POOR APPETITE OR OVEREATING: SEVERAL DAYS
8. MOVING OR SPEAKING SO SLOWLY THAT OTHER PEOPLE COULD HAVE NOTICED. OR THE OPPOSITE, BEING SO FIGETY OR RESTLESS THAT YOU HAVE BEEN MOVING AROUND A LOT MORE THAN USUAL: NOT AT ALL
3. TROUBLE FALLING OR STAYING ASLEEP OR SLEEPING TOO MUCH: NOT AT ALL
4. FEELING TIRED OR HAVING LITTLE ENERGY: SEVERAL DAYS

## 2022-03-16 ASSESSMENT — PAIN DESCRIPTION - PAIN TYPE
TYPE: ACUTE PAIN
TYPE: SURGICAL PAIN
TYPE: ACUTE PAIN
TYPE: SURGICAL PAIN

## 2022-03-16 ASSESSMENT — COPD QUESTIONNAIRES
DURING THE PAST 4 WEEKS HOW MUCH DID YOU FEEL SHORT OF BREATH: NONE/LITTLE OF THE TIME
HAVE YOU SMOKED AT LEAST 100 CIGARETTES IN YOUR ENTIRE LIFE: NO/DON'T KNOW
DO YOU EVER COUGH UP ANY MUCUS OR PHLEGM?: NO/ONLY WITH OCCASIONAL COLDS OR INFECTIONS
COPD SCREENING SCORE: 0

## 2022-03-16 ASSESSMENT — PAIN SCALES - GENERAL: PAIN_LEVEL: 10

## 2022-03-16 NOTE — ANESTHESIA PROCEDURE NOTES
Airway    Date/Time: 3/16/2022 11:38 AM  Performed by: Bob Engle M.D.  Authorized by: Bob Engle M.D.     Location:  OR  Urgency:  Elective  Indications for Airway Management:  Anesthesia      Spontaneous Ventilation: absent    Sedation Level:  Deep  Preoxygenated: Yes    Patient Position:  Sniffing  Final Airway Type:  Endotracheal airway  Final Endotracheal Airway:  ETT  Cuffed: Yes    Technique Used for Successful ETT Placement:  Direct laryngoscopy    Insertion Site:  Oral  Blade Type:  Holliday  Laryngoscope Blade/Videolaryngoscope Blade Size:  3  ETT Size (mm):  9.0  Leak Pressue (cm H2O):  20  Measured from:  Teeth  ETT to Teeth (cm):  25  Placement Verified by: auscultation and capnometry    Cormack-Lehane Classification:  Grade I - full view of glottis  Number of Attempts at Approach:  1  Ventilation Between Attempts:  None  Number of Other Approaches Attempted:  0

## 2022-03-16 NOTE — OR NURSING
Awake, alert and tolerating PO fluids.  Medicated for pain.  Vitals stable.  On monitors with alarms audible.    Called family member with update left voicemail.

## 2022-03-16 NOTE — ANESTHESIA POSTPROCEDURE EVALUATION
Patient: Sudeep West    Procedure Summary     Date: 03/16/22 Room / Location: [place] [id number] / SURGERY [facility]    Anesthesia Start: 1132 Anesthesia Stop: 1226    Procedure: FUNDOPLICATION, NISSEN, LAPAROSCOPIC (Abdomen) Diagnosis: (HIATAL HERNIA)    Surgeons: Surendra Carreon M.D. Responsible Provider: Bob Engle M.D.    Anesthesia Type: general ASA Status: 3          Final Anesthesia Type: general  Last vitals  BP   Blood Pressure: 120/67    Temp   37.1 °C (98.7 °F)    Pulse   100   Resp   16    SpO2   97 %      Anesthesia Post Evaluation    Patient location during evaluation: PACU  Patient participation: complete - patient participated  Level of consciousness: awake and alert  Pain score: 10    Airway patency: patent  Anesthetic complications: no  Cardiovascular status: hemodynamically stable  Respiratory status: acceptable  Hydration status: euvolemic    PONV: none          No complications documented.     Nurse Pain Score: 10 (NPRS)

## 2022-03-16 NOTE — OP REPORT
NAME:  Sudeep West  MRN:  [id number]  :  1985      DATE OF OPERATION: 3/16/2022    PREOPERATIVE DIAGNOSIS:  Paraesophageal hiatal hernia    POSTOPERATIVE DIAGNOSIS: Paraesophageal hiatal hernia     OPERATION PERFORMED:   1. Laparoscopic reduction of incarcerated paraesophageal gastric herniation.   2. Hiatal hernia repair with xenograft placement.   3. Laparoscopic Nissen fundoplication.    SURGEON: Surendra Carreon MD    ASSISTANT:  Kmaila Borges PA-C, PA-C    ANESTHESIOLOGIST:  Anesthesiologist: Bob Engle M.D.    ANESTHESIA: General endotracheal anesthesia.     SPECIMEN: none    ESTIMATED BLOOD LOSS: <10cc.     INDICATIONS: The patient is a 36 y.o. male with a diagnosis of hiatal hernia with severe symptoms. He comes today for surgical repair.  He is taken to the operating room today for Laparoscopic Nissen Fundoplication.    DETAILS OF PROCEDURE: After an extensive informed consent discussion process, the patient was brought to the operating room. He was placed in the supine position on the operating table. After induction of general anesthesia and placement of an endotracheal tube, the abdomen was prepped and draped in the usual sterile fashion. After administration of intravenous antibiotics, a bladeless optical entry trocar was carefully inserted into the abdomen. Pneumoperitoneum was established in the usual fashion. A bladeless 5 mm separator trocar was introduced. The laparoscope was introduced. Three additional separator trocars were placed in the upper abdomen and a 5 mm epigastric Tess-type liver retractor was placed to elevate the left lateral segment of the liver,   it was secured to the patient's right side with a robot arm.     Careful examination demonstrated a near complete intrathoracic stomach and   omentum and a large hiatal hernia. The table was placed in reverse Trendelenburg position and gradually the stomach and omentum were reduced. The attachments were slowly divided  with the Enseal device and the hernia sac was gradually freed allowing us to fully reduce the stomach. There was no esophageal shortening. The left and   right crura were well exposed circumferentially. We began with posterior crural approximating sutures using 0 Ethibond endo-stitches. A series of anterior crural approximating sutures were also placed in a similar fashion. A 42-Amharic blunt tipped bougie was passed down well into the stomach.     A resorbable 6x10cm telebio graft was soaked in saline solution and then a fenestrated V-cut, so that it would reside nicely around the gastroesophageal junction. It was laparoscopically placed and maneuvered into position, so as to help reinforce the crural closure. The graft was sutured to the crural closure with Polysorb Endo stitches with the rough regenerative side facing toward the muscle closure and the smooth side facing toward the gastric serosa. The greater curvature attachments and all the short gastric vessels were all   divided with the LigaSure device. We had nice mobility of the greater curve of the stomach, which was well vascularized and easily passed around posterior to the gastroesophageal junction region. At this time, the fundoplication was now created. A very loose floppy Nissen fundoplication was created over the bougie and loose seromuscular sutures were used to approximate it anteriorly as well as some anchoring sutures from the wrap to the graft and crura.     After final inspections demonstrated a nice result with excellent hemostasis and floppy comfortable wrap, the bougie was removed. The liver retractor was then removed. The pneumoperitoneum was allowed to escape. The ports were removed under direct vision. The port sites were irrigated and closed with Vicryl sutures. Steri-Strips and sterile dressings were applied.    The patient tolerated the procedure well and there were no apparent complications. All sponge, needle, and instrument counts  were correct on 2 separate occasions. He was awakened, extubated, and transferred to the recovery room in satisfactory condition.       ____________________________________   Surendra Carreon MD  DD: 3/16/2022  1:42 PM    CC:  [facility];

## 2022-03-16 NOTE — ANESTHESIA PREPROCEDURE EVALUATION
Case: [id number] Date/Time: 03/16/22 1215    Procedure: FUNDOPLICATION, NISSEN, LAPAROSCOPIC    Pre-op diagnosis: HIATAL HERNIA    Location: [facility] OR 10 / SURGERY [facility]    Surgeons: Surendra Carreon M.D.          Relevant Problems   No relevant active problems       Physical Exam    Airway   Mallampati: II  TM distance: >3 FB  Neck ROM: full       Cardiovascular - normal exam  Rhythm: regular  Rate: normal  (-) murmur     Dental - normal exam           Pulmonary - normal exam  Breath sounds clear to auscultation     Abdominal   (+) scaphoid    Comments: Cachectic     Neurological - normal exam               cachectic  Anesthesia Plan    ASA 3       Plan - general       Airway plan will be ETT          Induction: intravenous    Postoperative Plan: Postoperative administration of opioids is intended.    Pertinent diagnostic labs and testing reviewed    Informed Consent:    Anesthetic plan and risks discussed with patient.    Use of blood products discussed with: patient whom consented to blood products.

## 2022-03-16 NOTE — PROGRESS NOTES
4 Eyes Skin Assessment Completed by TONG Chiu and TONG Goldsmith.    Head WDL  Ears WDL  Nose WDL  Mouth WDL  Neck WDL  Breast/Chest WDL  Shoulder Blades WDL  Spine WDL  (R) Arm/Elbow/Hand WDL  (L) Arm/Elbow/Hand WDL  Abdomen Incision  Groin WDL  Scrotum/Coccyx/Buttocks WDL  (R) Leg WDL  (L) Leg WDL  (R) Heel/Foot/Toe WDL  (L) Heel/Foot/Toe WDL          Devices In Places Pulse Ox      Interventions In Place Gray Ear Foams    Possible Skin Injury No    Pictures Uploaded Into Epic N/A  Wound Consult Placed N/A  RN Wound Prevention Protocol Ordered No   4

## 2022-03-16 NOTE — ANESTHESIA TIME REPORT
Anesthesia Start and Stop Event Times     Date Time Event    3/16/2022 1126 Ready for Procedure     1132 Anesthesia Start     1226 Anesthesia Stop        Responsible Staff  03/16/22    Name Role Begin End    Bob Engle M.D. Anesth 1132 1226        Preop Diagnosis (Free Text):  Pre-op Diagnosis     HIATAL HERNIA        Preop Diagnosis (Codes):    Premium Reason  Non-Premium    Comments:

## 2022-03-17 VITALS
SYSTOLIC BLOOD PRESSURE: 111 MMHG | WEIGHT: 133 LBS | TEMPERATURE: 98.9 F | HEIGHT: 75 IN | DIASTOLIC BLOOD PRESSURE: 75 MMHG | OXYGEN SATURATION: 94 % | HEART RATE: 80 BPM | BODY MASS INDEX: 16.54 KG/M2 | RESPIRATION RATE: 18 BRPM

## 2022-03-17 LAB
ALBUMIN SERPL BCP-MCNC: 4 G/DL (ref 3.2–4.9)
ALBUMIN/GLOB SERPL: 1.7 G/DL
ALP SERPL-CCNC: 51 U/L (ref 30–99)
ALT SERPL-CCNC: 12 U/L (ref 2–50)
ANION GAP SERPL CALC-SCNC: 14 MMOL/L (ref 7–16)
AST SERPL-CCNC: 24 U/L (ref 12–45)
BILIRUB SERPL-MCNC: 1.1 MG/DL (ref 0.1–1.5)
BUN SERPL-MCNC: 12 MG/DL (ref 8–22)
CALCIUM SERPL-MCNC: 9 MG/DL (ref 8.5–10.5)
CHLORIDE SERPL-SCNC: 103 MMOL/L (ref 96–112)
CO2 SERPL-SCNC: 22 MMOL/L (ref 20–33)
CREAT SERPL-MCNC: 0.83 MG/DL (ref 0.5–1.4)
ERYTHROCYTE [DISTWIDTH] IN BLOOD BY AUTOMATED COUNT: 38.4 FL (ref 35.9–50)
GFR SERPLBLD CREATININE-BSD FMLA CKD-EPI: 117 ML/MIN/1.73 M 2
GLOBULIN SER CALC-MCNC: 2.3 G/DL (ref 1.9–3.5)
GLUCOSE SERPL-MCNC: 112 MG/DL (ref 65–99)
HCT VFR BLD AUTO: 37.2 % (ref 42–52)
HGB BLD-MCNC: 12.4 G/DL (ref 14–18)
MCH RBC QN AUTO: 30 PG (ref 27–33)
MCHC RBC AUTO-ENTMCNC: 33.3 G/DL (ref 33.7–35.3)
MCV RBC AUTO: 89.9 FL (ref 81.4–97.8)
PLATELET # BLD AUTO: 209 K/UL (ref 164–446)
PMV BLD AUTO: 8.7 FL (ref 9–12.9)
POTASSIUM SERPL-SCNC: 4.7 MMOL/L (ref 3.6–5.5)
PROT SERPL-MCNC: 6.3 G/DL (ref 6–8.2)
RBC # BLD AUTO: 4.14 M/UL (ref 4.7–6.1)
SODIUM SERPL-SCNC: 139 MMOL/L (ref 135–145)
WBC # BLD AUTO: 16.1 K/UL (ref 4.8–10.8)

## 2022-03-17 PROCEDURE — 700111 HCHG RX REV CODE 636 W/ 250 OVERRIDE (IP): Performed by: PHYSICIAN ASSISTANT

## 2022-03-17 PROCEDURE — 85027 COMPLETE CBC AUTOMATED: CPT

## 2022-03-17 PROCEDURE — 80053 COMPREHEN METABOLIC PANEL: CPT

## 2022-03-17 PROCEDURE — 96372 THER/PROPH/DIAG INJ SC/IM: CPT

## 2022-03-17 PROCEDURE — G0378 HOSPITAL OBSERVATION PER HR: HCPCS

## 2022-03-17 PROCEDURE — A9270 NON-COVERED ITEM OR SERVICE: HCPCS | Performed by: PHYSICIAN ASSISTANT

## 2022-03-17 PROCEDURE — 36415 COLL VENOUS BLD VENIPUNCTURE: CPT

## 2022-03-17 PROCEDURE — 700101 HCHG RX REV CODE 250: Performed by: PHYSICIAN ASSISTANT

## 2022-03-17 PROCEDURE — 96376 TX/PRO/DX INJ SAME DRUG ADON: CPT

## 2022-03-17 PROCEDURE — 700102 HCHG RX REV CODE 250 W/ 637 OVERRIDE(OP): Performed by: PHYSICIAN ASSISTANT

## 2022-03-17 RX ORDER — ONDANSETRON 4 MG/1
4 TABLET, ORALLY DISINTEGRATING ORAL EVERY 6 HOURS PRN
Qty: 20 TABLET | Refills: 0 | Status: ON HOLD | OUTPATIENT
Start: 2022-03-17 | End: 2022-04-08 | Stop reason: SDUPTHER

## 2022-03-17 RX ADMIN — POTASSIUM CHLORIDE AND SODIUM CHLORIDE: 900; 150 INJECTION, SOLUTION INTRAVENOUS at 00:35

## 2022-03-17 RX ADMIN — OXYCODONE HYDROCHLORIDE 10 MG: 5 SOLUTION ORAL at 02:44

## 2022-03-17 RX ADMIN — OXYCODONE HYDROCHLORIDE 10 MG: 5 SOLUTION ORAL at 06:29

## 2022-03-17 RX ADMIN — OXYCODONE HYDROCHLORIDE 10 MG: 5 SOLUTION ORAL at 09:35

## 2022-03-17 RX ADMIN — FAMOTIDINE 20 MG: 10 INJECTION INTRAVENOUS at 05:00

## 2022-03-17 RX ADMIN — ACETAMINOPHEN 1000 MG: 500 TABLET ORAL at 05:01

## 2022-03-17 RX ADMIN — ENOXAPARIN SODIUM 40 MG: 40 INJECTION SUBCUTANEOUS at 08:03

## 2022-03-17 RX ADMIN — DIPHENHYDRAMINE HYDROCHLORIDE 25 MG: 50 INJECTION INTRAMUSCULAR; INTRAVENOUS at 05:01

## 2022-03-17 ASSESSMENT — ENCOUNTER SYMPTOMS
PALPITATIONS: 0
HEARTBURN: 0
FEVER: 0
ABDOMINAL PAIN: 1
NAUSEA: 0
DIARRHEA: 0
COUGH: 0
VOMITING: 0
SHORTNESS OF BREATH: 0
CHILLS: 0

## 2022-03-17 ASSESSMENT — PAIN DESCRIPTION - PAIN TYPE
TYPE: ACUTE PAIN

## 2022-03-17 NOTE — DISCHARGE INSTRUCTIONS
Discharge Instructions    Discharged to home by car with relative. Discharged via wheelchair, hospital escort: Yes.  Special equipment needed: Not Applicable    Be sure to schedule a follow-up appointment with your primary care doctor or any specialists as instructed.     Discharge Plan:   Diet Plan: Discussed  Activity Level: Discussed  Confirmed Follow up Appointment: Patient to Call and Schedule Appointment  Confirmed Symptoms Management: Discussed  Medication Reconciliation Updated: Yes  Influenza Vaccine Indication: Patient Refuses    I understand that a diet low in cholesterol, fat, and sodium is recommended for good health. Unless I have been given specific instructions below for another diet, I accept this instruction as my diet prescription.   Other diet:     Special Instructions: None    · Is patient discharged on Warfarin / Coumadin? No   Pain Management after Surgery, Injury or Illness    What should I expect if I have pain?  • Some pain may be normal.  • It is important to know that it may not be possible to completely eliminate your pain.  • Our goal is to help you to manage your pain so that you can get back to your normal routine as soon as possible.  • We will work together to create a plan to manage your pain and track the progress of the plan.  • If you have questions about your care, please tell your nurse or provider.  How is my pain measured?  • Your pain will be measured on a scale of 0 to 10.  • The pain rating scale will help you to score your pain based on your ability to function with that pain.  • For example, a score of:  • 0 = No pain  • 5 = Pain interrupts some activities  • 10 = Pain is as bad as it can be, nothing else matters  • Remember, some pain is expected following illness, injury or surgery.  How will my pain be treated?  • You may be offered medication to treat your pain.  • You can also use non-medicine treatments to help manage your pain.  • If you have severe, uncontrolled  pain, you may be prescribed narcotics, also known as opioids.  • You have the right to learn about your options and work with your care team to find the best treatment to manage your pain.  Non-Opioid Medicines  • Many effective medicines do not need a prescription. Examples include:  o Acetaminophen, which you may know as Tylenol®  o Ibuprofen, which you may know as Advil® or Motrin®  o Aspirin  • Other low risk medicines require a prescription and are helpful for treating pain. Examples include:  o Celecoxib, which you may know as Celebrex®  Alternative Therapies  • Alternative therapies can be very helpful in managing pain. You can try:  • Ice or heat packs as recommended by your care team.  • Massage, relaxation techniques or meditation.  • Changing positions in bed.  • Watching TV or listening to music.  Opioids, also known as Narcotics  • Opioids should only be used to treat severe pain that cannot be controlled by other methods.  • Opioids have been shown to increase your risk of complications.  • Opioids are highly addictive.  • Opioids should only be used in the lowest effective dose, for a limited amount of time.  • Opioids require a prescription. Some examples include:  o Tramadol, known as Ultram®.  o Hydrocodone with acetaminophen, known as Lortab®, Vicodin®, Norco®.  o Oxycodone with acetaminophen, known as Percocet®, or Roxicet®.  o Oxycodone, known as OxyContin®.  o Morphine, known as MS Contin®.  What will happen after I go home from the hospital?  • Your care team will discuss your ongoing care plan with you before you leave.  • You will be given detailed instructions for any medicine and follow up care.  • You may be given a prescription for medicines to take when you go home.  • Be sure to tell your care team if you have concerns about caring for yourself at home. Common concerns may include stairs, or living alone.  How should I manage pain when I go home?  • Always use non-opioid and  non-medicine options first.  • Take non-opioid medicine regularly, as instructed by your care team.  • Avoid doing things that make your pain worse like heavy lifting or straining.  • Use opioids only to treat severe pain that is not controlled by other methods.  • Always follow the instructions of your care team.  • Always take the lowest effective dose.  • Do not take more than prescribed.  • Do not mix with sleeping pills or alcohol.  • Stop taking opioids when the pain can be managed by other methods listed above.  • You may also be referred to a pain specialist when needed.    IMPORTANT INFORMATION:  Side effects of Opiates may include:  • Sleepiness  • Dizziness  • Nausea and/or vomiting  • Constipation  • Decreased breathing  • Addiction  • Overdose  • Death    Opiate dependency and addiction risk:  • The risk of dependency increases after 3 days of continuous use.  • There are many local resources to help with dependency issues.  • Opiate dependency can develop easily. Don’t feel ashamed.  • Speak to your care team if you have concerns.  General medicine safety:  • Keep all medicines in a safe place, out of sight so they cannot be taken by someone else.  • Keep out of reach of children.  • Never mix opioids with sleeping pills, over the counter sleep aids or alcohol.  • Do not drive while taking opioids.  • Be careful at home when cooking, bathing, showering or using stairs.  You may be more likely to hurt yourself or fall.  • For anyone taking opioids, watch for excessive sleepiness or decreased breathing as a sign of overdose. Try to arouse the person if you are concerned. Call 911 if you are unable to awaken them OR if their breathing is shallow, slow, or irregular.  Proper medicine disposal:  • Empty liquid medicine from containers, open capsules or crush tablets and mix with jia litter, dirt or old coffee grounds. Place the mixture into a sealed bag or container and throw it in the trash.  • Take  medicines to a local drug takeback center, for a list visit: https://apps.Answer.Toion.[employer]ThePresent.Co.gov/pubdispsearch/spring/main?execution=e1s2  • Use a home drug disposal pouch.  Contact your local pharmacy for help.    Clear Liquid Diet, Adult  A clear liquid diet is a diet that includes only liquids and semi-liquids that you can see through. You do not eat any food on this diet. Most people need to follow this diet for only a short time.  You may need to follow a clear liquid diet if:  · You have a problem right before or after you have surgery.  · You did not eat food for a long time.  · You had any of these:  ? Feeling sick to your stomach (nausea).  ? Throwing up (vomiting).  ? Passing a watery stool (diarrhea).  · You are going to have an exam to look at parts of your digestive system.  · You are going to have bowel surgery.  The goals of this diet are:  · To rest the stomach.  · To help you clear the digestive system before an exam.  · To make sure that there is enough fluid in your body.  · To make sure you get some energy.  · To help you get back to eating like you used to.  What are tips for following this plan?  · A clear liquid is a liquid or semi-liquid that you can see through when you hold it up to a light. An example of this is gelatin.  · This diet does not give you all the nutrients that you need. Choose a variety of the liquids that your doctor says you can drink on this diet. That way, you will get as many nutrients as possible.  · If you are not sure whether you can have certain items, ask your doctor.  If you are unable to swallow a thin liquid, you will need to thicken it before taking it. This will stop you from breathing it in (aspiration).  What foods should I eat?    · Water and flavored water.  · Fruit juices that do not have pulp, such as cranberry juice and apple juice.  · Tea and coffee without milk or cream.  · Clear bouillon or broth.  · Broth-based soups that have been  strained.  · Flavored gelatins.  · Honey.  · Sugar water.  · Ice or frozen ice pops that do not have any milk, yogurt, fruit pieces, or fruit pulp in them.  · Clear sodas.  · Clear sports drinks.  The items listed above may not be a complete list of what you can eat and drink. Contact a dietitian for more options.  What foods should I avoid?  · Juices that have pulp.  · Milk.  · Cream or cream-based soups.  · Yogurt.  · Normal foods that are not clear liquids or semi-liquids.  The items listed above may not be a complete list of what you should not eat and drink. Contact a dietitian for options.  Questions to ask your health care provider  · How long do I need to follow this diet?  · Are there any medicines that I should change while on this diet?  Summary  · A clear liquid diet is a diet that includes only liquids and semi-liquids that you can see through.  · Some goals of this diet are to rest your stomach, make sure you get enough fluid, and give you some energy.  · Avoid liquids with milk, cream, or pulp while you are on this diet.  This information is not intended to replace advice given to you by your health care provider. Make sure you discuss any questions you have with your health care provider.  Document Released: 11/30/2009 Document Revised: 06/10/2019 Document Reviewed: 06/10/2019  [employer] Patient Education © 2020 [employer]      Depression / Suicide Risk    As you are discharged from this [facility] facility, it is important to learn how to keep safe from harming yourself.    Recognize the warning signs:  · Abrupt changes in personality, positive or negative- including increase in energy   · Giving away possessions  · Change in eating patterns- significant weight changes-  positive or negative  · Change in sleeping patterns- unable to sleep or sleeping all the time   · Unwillingness or inability to communicate  · Depression  · Unusual sadness, discouragement and loneliness  · Talk of wanting to  die  · Neglect of personal appearance   · Rebelliousness- reckless behavior  · Withdrawal from people/activities they love  · Confusion- inability to concentrate     If you or a loved one observes any of these behaviors or has concerns about self-harm, here's what you can do:  · Talk about it- your feelings and reasons for harming yourself  · Remove any means that you might use to hurt yourself (examples: pills, rope, extension cords, firearm)  · Get professional help from the community (Mental Health, Substance Abuse, psychological counseling)  · Do not be alone:Call your Safe Contact- someone whom you trust who will be there for you.  · Call your local CRISIS HOTLINE 670-5983 or 654-259-9308  · Call your local Children's Mobile Crisis Response Team [place] (093) 743-7745 or www.iOculi  · Call the toll free National Suicide Prevention Hotlines   · National Suicide Prevention Lifeline 831-862-TUWM (1088)  · [facility] Hope Line Network 800-SUICIDE (737-6708)      Post-Nissen Discharge Instructions:    Please see Dr. Carreon for office follow-up 1-2 weeks after surgery. It is crucial that you come in for this visit.     Call [phone] for an appointment.     Diet:     Day 1-2: Clear Liquids    These are liquids that are transparent. Examples are broth, water, clear juices like apple and grape, low-calorie, non-carbonated dfrinks such as Crystal Light, warm or cold decaf tea, sugar free popsicles or jello, and clear protein drinks such as Isopure (found at [facility] and other retail stores). Sip small amounts throughout the day, aim for 48 oz. Do not attempt to swallow more than a teaspoon at a time. Avoid carbonated beverages and sodas.    Day 3-7: Full Liquids    Full liquids can pour off a spoon and contain no lumps or food pieces. Examples are all the drinks from the clear liquid list, meal replacement of protein shakes ([employer] Perfect Meal), milk, tomato juice, mashed potatoes, thinned with milk or  broth, cream soups withouht lumps, yogurt, thinned to pour off a spoon, warm cereal such as cream of wheat, thinned to pour off a spoon. Drink water as well as full liquids for optimial hydration, sip small amounts throughout the day. Do not attempt to swallow more than a teaspoon at a time.    Next 2-3 weeks: Soft Foods    Soft foods are just that - soft. They should be easy to chew and easy to mash with a fork. You may need to grind, blend, peel or finely chop foods to achieve a soft texture. Chew well. Examples are cottage cheese, soft cheese, refried and other soft cooked beans, white flaky fish, eggs and egg substitutes, oatmeal and cream of wheat,m ground lean meats, potato without skin, soft cooked vegetables, unsweetened canned fruit, soft fresh fruit such as bananas, and whole grain crackers. Start with very soft foods and progress slowly, chewing your food very well. Eat 5-6 small meals a day, no more than one cup of food total each meal.    Week 4 and beyond:    Now you should be resuming a regular diet. Remember to chew your food well and don't introduce more than one or two new foods into your diet per day. You may have difficulty or inability to belch after surgery. This is a normal consequence of the procedure. To decrease symptoms, avoid carbonated beverages and sodas.    Activity:   In general, you may resume normal activity including sports and sex as soon as you are up to it. A few activities that suddenly increase pressure in the abdominal cavity (e.g., popping wheelies on bikes, abdominal crunches) should be avoided for 6 weeks after surgery. You should restrict heavy lifting for 6 weeks (over 15 lbs.). A bloated sensation is common and loose clothes are needed for a few days or week.       Chest and Shoulder Pains:   Sometimes patients will experience shoulder pain, or deep pain in the chest after surgery. This is due in part to the gas used at laparoscopy, but more so to the sutures placed in  the diaphragm muscle; and should gradually resolve.     If Food Sticks:   It is not uncommon for patients to experience food sticking -sometimes the only thing you feel is severe pain on swallowing - for a while after surgery. When this happens the best things to do are to stand up, to walkaround slowly, and to try sipping some lukewarm water. Generally these pains will pass within 10-15 minutes; if they persist longer you should call Dr. Carreon's office.    Medications:   You have been given a prescription for a pain medication. If you don’t need as much pain medicine, you can take two extra strength Tylenol every 6 hours. Drink plenty of liquids. If you don’t have a bowel movement for two or three days take Metamucil (one tablespoon in 16 oz. water or juice twice a day), or Mineral Oil (2 tablespoons by mouth twice a day), or Milk of Magnesia (1-2 tablespoons once a day). You may resume other medications you were on prior to surgery. Continue any heartburn medication: Prilosec, Prevacid, Axid, Pepcid, Tagamet, Zantac.       Incisions:   Remove the gauze covered with tape 48 hours after surgery. Leave on the small strips of tape (steri-strips). They will fall of in 5-7 days. You may shower. Some swelling and a lump under the incision will develop and is part of the natural healing process; you needn't be alarmed unless there is drainage more than a Band-Aid will handle. Bruising may occur here too. Do not soak in a bathtub or swimming pool for 2 weeks. After 48 hours it is not necessary to keep your incision covered unless it makes you more comfortable.     Call for:   Call if you have (1) Fevers to more than 101.50 F, (2) Unusual chest or leg pain, (3) Drainage or fluid from incision that may be foul smelling, increased tenderness or soreness at the wound or the wound edges are no longer together, redness or swelling at the incision site. Please do not hesitate to call with any other questions.     Office  address:   [address], [address], [place] [address]    [facility]  [phone]                ACTIVITY: Rest and take it easy for the first 24 hours.  A responsible adult is recommended to remain with you during that time.  It is normal to feel sleepy.  We encourage you to not do anything that requires balance, judgment or coordination.    MILD FLU-LIKE SYMPTOMS ARE NORMAL. YOU MAY EXPERIENCE GENERALIZED MUSCLE ACHES, THROAT IRRITATION, HEADACHE AND/OR SOME NAUSEA.    FOR 24 HOURS DO NOT:  Drive, operate machinery or run household appliances.  Drink beer or alcoholic beverages.   Make important decisions or sign legal documents.    SPECIAL INSTRUCTIONS:     DIET: To avoid nausea, slowly advance diet as tolerated, avoiding spicy or greasy foods for the first day.  Add more substantial food to your diet according to your physician's instructions.  Babies can be fed formula or breast milk as soon as they are hungry.  INCREASE FLUIDS AND FIBER TO AVOID CONSTIPATION.    SURGICAL DRESSING/BATHING: See Dr. Carreon instructions above    FOLLOW-UP APPOINTMENT:  A follow-up appointment should be arranged with your doctor in 1-2 weeks; call to schedule.    You should CALL YOUR PHYSICIAN if you develop:  Fever greater than 101 degrees F.  Pain not relieved by medication, or persistent nausea or vomiting.  Excessive bleeding (blood soaking through dressing) or unexpected drainage from the wound.  Extreme redness or swelling around the incision site, drainage of pus or foul smelling drainage.  Inability to urinate or empty your bladder within 8 hours.  Problems with breathing or chest pain.    You should call 911 if you develop problems with breathing or chest pain.  If you are unable to contact your doctor or surgical center, you should go to the nearest emergency room or urgent care center.  Physician's telephone #: [phone]    If any questions arise, call your doctor.  If your doctor is not  available, please feel free to call the Surgical Center at [phone].     A registered nurse may call you a few days after your surgery to see how you are doing after your procedure.    MEDICATIONS: Resume taking daily medication.  Take prescribed pain medication with food.  If no medication is prescribed, you may take non-aspirin pain medication if needed.  PAIN MEDICATION CAN BE VERY CONSTIPATING.  Take a stool softener or laxative such as senokot, pericolace, or milk of magnesia if needed.    Prescription given for ___.  Last pain medication given at ___.    If your physician has prescribed pain medication that includes Acetaminophen (Tylenol), do not take additional Acetaminophen (Tylenol) while taking the prescribed medication.    Depression / Suicide Risk    As you are discharged from this [facility] facility, it is important to learn how to keep safe from harming yourself.    Recognize the warning signs:  · Abrupt changes in personality, positive or negative- including increase in energy   · Giving away possessions  · Change in eating patterns- significant weight changes-  positive or negative  · Change in sleeping patterns- unable to sleep or sleeping all the time   · Unwillingness or inability to communicate  · Depression  · Unusual sadness, discouragement and loneliness  · Talk of wanting to die  · Neglect of personal appearance   · Rebelliousness- reckless behavior  · Withdrawal from people/activities they love  · Confusion- inability to concentrate     If you or a loved one observes any of these behaviors or has concerns about self-harm, here's what you can do:  · Talk about it- your feelings and reasons for harming yourself  · Remove any means that you might use to hurt yourself (examples: pills, rope, extension cords, firearm)  · Get professional help from the community (Mental Health, Substance Abuse, psychological counseling)  · Do not be alone:Call your Safe Contact- someone whom you trust  who will be there for you.  · Call your local CRISIS HOTLINE 573-9243 or 270-516-1592  · Call your local Children's Mobile Crisis Response Team [place] (693) 046-5395 or www.ID Watchdog  · Call the toll free National Suicide Prevention Hotlines   · National Suicide Prevention Lifeline 051-025-ZHIH (4331)  · National PubMatic Line Network 800-SUICIDE (992-1107)

## 2022-03-17 NOTE — DIETARY
"Nutrition services: Day 0 of admit.  Sudeep West is a 36 y.o. male with admitting DX of hiatal hernia with severe symptoms s/p laparoscopic reduction of incarcerated paraesophageal gastric herniation, Hiatal hernia repair with xenograft placement, Laparoscopic Nissen fundoplication.    Patient with poor PO intake prior to admit and low BMI noted on admit screen. Patient reported he has been able to maintain his weight at approximately 130# for the past few months.  He reported he was only able to eat a few bites of his clear liquid breakfast but will be discharging today.    Assessment:  Height: 190.5 cm (6' 3\")  Weight: 60.3 kg (133 lb)  Body mass index is 16.62 kg/m².   Diet/Intake: clear liquids    Evaluation:   1. IVF of NaCl and KCl running at 100 ml/hr per MAR  2. Per chart review he weighed 143# in October of 2020  3. Upon physical observation, patient is cachectic with multiple bony prominences    Malnutrition Risk: Chronic severe malnutrition as evidenced by severe fat and severe muscle losses.    Recommendations/Inteventions/Plan:  1. Nutrition rep will continue to see patient for ongoing meal and snack preferences.     RD following.      "

## 2022-03-17 NOTE — CARE PLAN
The patient is Stable - Low risk of patient condition declining or worsening    Shift Goals  Clinical Goals: pain control, BM  Patient Goals: pain control, rest    Progress made toward(s) clinical / shift goals:      Problem: Pain - Standard  Goal: Alleviation of pain or a reduction in pain to the patient’s comfort goal  Outcome: Progressing  Note: PRN pain medications given per MAR working effectively to promote comfort. Pt has increased pain with movement.      Problem: Knowledge Deficit - Standard  Goal: Patient and family/care givers will demonstrate understanding of plan of care, disease process/condition, diagnostic tests and medications  Outcome: Progressing  Note: A/Ox4, pt is able to understand plan of care. All questions answered at the moment.       Problem: Respiratory  Goal: Patient will achieve/maintain optimum respiratory ventilation and gas exchange  Outcome: Progressing  Note: Pt currently on room air tolerating well. Will continue to monitor.        Patient is not progressing towards the following goals:

## 2022-03-17 NOTE — PROGRESS NOTES
Surgical Progress Note    Author: Kamila Borges P.A.-C. Date & Time created: 3/17/2022   9:39 AM     Interval Events:  POD#1 Laparoscopic reduction of incarcerated paraesophageal gastric herniation, Hiatal hernia repair with xenograft placement, Laparoscopic Nissen fundoplication.  Tolerating clears without nausea/vomiting. Denies dysphagia. Denies heartburn. Admits to typical incisional abdominal pain, mostly controlled with medication. Pt is ambulating some and he is voiding.     Review of Systems   Constitutional: Negative for chills and fever.   Respiratory: Negative for cough and shortness of breath.    Cardiovascular: Negative for chest pain and palpitations.   Gastrointestinal: Positive for abdominal pain (incisional). Negative for diarrhea, heartburn, nausea and vomiting.   Genitourinary: Negative for dysuria.     Hemodynamics:  Temp (24hrs), Av.1 °C (98.7 °F), Min:36.3 °C (97.3 °F), Max:37.6 °C (99.7 °F)  Temperature: 37.2 °C (98.9 °F)  Pulse  Av.9  Min: 77  Max: 100   Blood Pressure: 111/75     Respiratory:    Respiration: 18, Pulse Oximetry: 94 %           Neuro:  GCS       Fluids:    Intake/Output Summary (Last 24 hours) at 3/17/2022 0939  Last data filed at 3/16/2022 1226  Gross per 24 hour   Intake 560.67 ml   Output 20 ml   Net 540.67 ml     Weight: 60.3 kg (133 lb)  Current Diet Order   Procedures   • Diet Order Diet: Clear Liquid     Physical Exam  HENT:      Head: Normocephalic and atraumatic.      Mouth/Throat:      Pharynx: Oropharynx is clear.   Eyes:      Conjunctiva/sclera: Conjunctivae normal.   Cardiovascular:      Rate and Rhythm: Normal rate and regular rhythm.   Pulmonary:      Effort: Pulmonary effort is normal.   Abdominal:      General: There is distension (mild).      Palpations: Abdomen is soft. There is no mass.      Tenderness: There is abdominal tenderness (incisional). There is no guarding or rebound.   Musculoskeletal:         General: Normal range of motion.       Cervical back: Normal range of motion.   Skin:     General: Skin is warm and dry.      Findings: No erythema or rash.      Comments: Incisions with minimal serosanguinous ooze   Neurological:      Mental Status: He is alert and oriented to person, place, and time.   Psychiatric:         Mood and Affect: Mood normal.       Labs:  Recent Results (from the past 24 hour(s))   CBC WITHOUT DIFFERENTIAL    Collection Time: 03/16/22 10:25 AM   Result Value Ref Range    WBC 8.0 4.8 - 10.8 K/uL    RBC 4.62 (L) 4.70 - 6.10 M/uL    Hemoglobin 14.0 14.0 - 18.0 g/dL    Hematocrit 41.2 (L) 42.0 - 52.0 %    MCV 89.2 81.4 - 97.8 fL    MCH 30.3 27.0 - 33.0 pg    MCHC 34.0 33.7 - 35.3 g/dL    RDW 38.6 35.9 - 50.0 fL    Platelet Count 274 164 - 446 K/uL    MPV 9.0 9.0 - 12.9 fL   BASIC METABOLIC PANEL    Collection Time: 03/16/22 10:25 AM   Result Value Ref Range    Sodium 139 135 - 145 mmol/L    Potassium 4.0 3.6 - 5.5 mmol/L    Chloride 103 96 - 112 mmol/L    Co2 26 20 - 33 mmol/L    Glucose 100 (H) 65 - 99 mg/dL    Bun 11 8 - 22 mg/dL    Creatinine 0.82 0.50 - 1.40 mg/dL    Calcium 9.9 8.5 - 10.5 mg/dL    Anion Gap 10.0 7.0 - 16.0   ESTIMATED GFR    Collection Time: 03/16/22 10:25 AM   Result Value Ref Range    GFR (CKD-EPI) 117 >60 mL/min/1.73 m 2   POCT SARS-COV Antigen MOHAMUD manual result ([facility] Only)    Collection Time: 03/16/22 10:30 AM   Result Value Ref Range    Internal  Valid     SARS-COV ANTIGEN MOHAMUD Negative Negative, Indeterminate, None Detected, Valid, Invalid, Pass   CBC without Differential (blood)    Collection Time: 03/17/22 12:12 AM   Result Value Ref Range    WBC 16.1 (H) 4.8 - 10.8 K/uL    RBC 4.14 (L) 4.70 - 6.10 M/uL    Hemoglobin 12.4 (L) 14.0 - 18.0 g/dL    Hematocrit 37.2 (L) 42.0 - 52.0 %    MCV 89.9 81.4 - 97.8 fL    MCH 30.0 27.0 - 33.0 pg    MCHC 33.3 (L) 33.7 - 35.3 g/dL    RDW 38.4 35.9 - 50.0 fL    Platelet Count 209 164 - 446 K/uL    MPV 8.7 (L) 9.0 - 12.9 fL   Comp Metabolic Panel (CMP)     Collection Time: 03/17/22 12:12 AM   Result Value Ref Range    Sodium 139 135 - 145 mmol/L    Potassium 4.7 3.6 - 5.5 mmol/L    Chloride 103 96 - 112 mmol/L    Co2 22 20 - 33 mmol/L    Anion Gap 14.0 7.0 - 16.0    Glucose 112 (H) 65 - 99 mg/dL    Bun 12 8 - 22 mg/dL    Creatinine 0.83 0.50 - 1.40 mg/dL    Calcium 9.0 8.5 - 10.5 mg/dL    AST(SGOT) 24 12 - 45 U/L    ALT(SGPT) 12 2 - 50 U/L    Alkaline Phosphatase 51 30 - 99 U/L    Total Bilirubin 1.1 0.1 - 1.5 mg/dL    Albumin 4.0 3.2 - 4.9 g/dL    Total Protein 6.3 6.0 - 8.2 g/dL    Globulin 2.3 1.9 - 3.5 g/dL    A-G Ratio 1.7 g/dL   ESTIMATED GFR    Collection Time: 03/17/22 12:12 AM   Result Value Ref Range    GFR (CKD-EPI) 117 >60 mL/min/1.73 m 2     Medical Decision Making, by Problem:  Active Hospital Problems    Diagnosis    • Hiatal hernia [K44.9]      Plan:  Pt is alert and oriented, NAD. Breathing unlabored. Tolerating sips PO.  Incisions ok. VS stable. Labs reviewed.  Leukocytosis, likely reactive. Encouraged ambulation and incentive spirometry.  Pt okay for discharge today. Follow up in the office in 1-2 weeks. Follow the postop diet plan provided by our office.  Pt also seen and examined by Dr. Carreon.      Quality Measures:  Quality-Core Measures   Reviewed items::  Labs reviewed  Vieyra catheter::  No Vieyra  DVT prophylaxis pharmacological::  Enoxaparin (Lovenox)  DVT prophylaxis - mechanical:  SCDs  Ulcer Prophylaxis::  Yes      Discussed patient condition with RN, Patient and Dr. Carreon

## 2022-03-17 NOTE — CARE PLAN
The patient is Watcher - Medium risk of patient condition declining or worsening    Shift Goals  Clinical Goals: pain control, rest  Patient Goals: pain control, rest    Progress made toward(s) clinical / shift goals:    Problem: Pain - Standard  Goal: Alleviation of pain or a reduction in pain to the patient’s comfort goal  Outcome: Progressing     Problem: Knowledge Deficit - Standard  Goal: Patient and family/care givers will demonstrate understanding of plan of care, disease process/condition, diagnostic tests and medications  Outcome: Progressing     Problem: Psychosocial  Goal: Patient's level of anxiety will decrease  Outcome: Progressing     Problem: Communication  Goal: The ability to communicate needs accurately and effectively will improve  Outcome: Progressing     Problem: Hemodynamics  Goal: Patient's hemodynamics, fluid balance and neurologic status will be stable or improve  Outcome: Progressing     Problem: Respiratory  Goal: Patient will achieve/maintain optimum respiratory ventilation and gas exchange  Outcome: Progressing     Problem: Fluid Volume  Goal: Fluid volume balance will be maintained  Outcome: Progressing     Problem: Self Care  Goal: Patient will have the ability to perform ADLs independently or with assistance (bathe, groom, dress, toilet and feed)  Outcome: Progressing     Problem: Infection - Standard  Goal: Patient will remain free from infection  Outcome: Progressing     Problem: Wound/ / Incision Healing  Goal: Patient's wound/surgical incision will decrease in size and heals properly  Outcome: Progressing

## 2022-03-18 ENCOUNTER — APPOINTMENT (OUTPATIENT)
Dept: RADIOLOGY | Facility: MEDICAL CENTER | Age: 37
End: 2022-03-18
Attending: EMERGENCY MEDICINE
Payer: MEDICAID

## 2022-03-18 ENCOUNTER — HOSPITAL ENCOUNTER (OUTPATIENT)
Facility: MEDICAL CENTER | Age: 37
End: 2022-03-20
Attending: EMERGENCY MEDICINE | Admitting: COLON & RECTAL SURGERY
Payer: MEDICAID

## 2022-03-18 DIAGNOSIS — E86.0 DEHYDRATION: ICD-10-CM

## 2022-03-18 DIAGNOSIS — D72.829 LEUKOCYTOSIS, UNSPECIFIED TYPE: ICD-10-CM

## 2022-03-18 DIAGNOSIS — G89.18 POST-OP PAIN: ICD-10-CM

## 2022-03-18 DIAGNOSIS — R55 SYNCOPE, UNSPECIFIED SYNCOPE TYPE: ICD-10-CM

## 2022-03-18 LAB
ALBUMIN SERPL BCP-MCNC: 4.4 G/DL (ref 3.2–4.9)
ALBUMIN/GLOB SERPL: 1.4 G/DL
ALP SERPL-CCNC: 61 U/L (ref 30–99)
ALT SERPL-CCNC: 10 U/L (ref 2–50)
ANION GAP SERPL CALC-SCNC: 16 MMOL/L (ref 7–16)
APPEARANCE UR: CLEAR
AST SERPL-CCNC: 15 U/L (ref 12–45)
BACTERIA #/AREA URNS HPF: NEGATIVE /HPF
BASOPHILS # BLD AUTO: 0.2 % (ref 0–1.8)
BASOPHILS # BLD: 0.03 K/UL (ref 0–0.12)
BILIRUB SERPL-MCNC: 1.4 MG/DL (ref 0.1–1.5)
BILIRUB UR QL STRIP.AUTO: NEGATIVE
BUN SERPL-MCNC: 9 MG/DL (ref 8–22)
CALCIUM SERPL-MCNC: 9.7 MG/DL (ref 8.5–10.5)
CHLORIDE SERPL-SCNC: 100 MMOL/L (ref 96–112)
CO2 SERPL-SCNC: 24 MMOL/L (ref 20–33)
COLOR UR: YELLOW
CREAT SERPL-MCNC: 0.85 MG/DL (ref 0.5–1.4)
EKG IMPRESSION: NORMAL
EOSINOPHIL # BLD AUTO: 0 K/UL (ref 0–0.51)
EOSINOPHIL NFR BLD: 0 % (ref 0–6.9)
EPI CELLS #/AREA URNS HPF: NEGATIVE /HPF
ERYTHROCYTE [DISTWIDTH] IN BLOOD BY AUTOMATED COUNT: 38 FL (ref 35.9–50)
GFR SERPLBLD CREATININE-BSD FMLA CKD-EPI: 116 ML/MIN/1.73 M 2
GLOBULIN SER CALC-MCNC: 3.1 G/DL (ref 1.9–3.5)
GLUCOSE SERPL-MCNC: 119 MG/DL (ref 65–99)
GLUCOSE UR STRIP.AUTO-MCNC: NEGATIVE MG/DL
HCT VFR BLD AUTO: 38.9 % (ref 42–52)
HGB BLD-MCNC: 13.1 G/DL (ref 14–18)
HYALINE CASTS #/AREA URNS LPF: ABNORMAL /LPF
IMM GRANULOCYTES # BLD AUTO: 0.12 K/UL (ref 0–0.11)
IMM GRANULOCYTES NFR BLD AUTO: 0.7 % (ref 0–0.9)
KETONES UR STRIP.AUTO-MCNC: 80 MG/DL
LEUKOCYTE ESTERASE UR QL STRIP.AUTO: ABNORMAL
LIPASE SERPL-CCNC: 22 U/L (ref 11–82)
LYMPHOCYTES # BLD AUTO: 0.81 K/UL (ref 1–4.8)
LYMPHOCYTES NFR BLD: 4.8 % (ref 22–41)
MAGNESIUM SERPL-MCNC: 1.7 MG/DL (ref 1.5–2.5)
MCH RBC QN AUTO: 30.1 PG (ref 27–33)
MCHC RBC AUTO-ENTMCNC: 33.7 G/DL (ref 33.7–35.3)
MCV RBC AUTO: 89.4 FL (ref 81.4–97.8)
MICRO URNS: ABNORMAL
MONOCYTES # BLD AUTO: 1.5 K/UL (ref 0–0.85)
MONOCYTES NFR BLD AUTO: 8.8 % (ref 0–13.4)
NEUTROPHILS # BLD AUTO: 14.52 K/UL (ref 1.82–7.42)
NEUTROPHILS NFR BLD: 85.5 % (ref 44–72)
NITRITE UR QL STRIP.AUTO: NEGATIVE
NRBC # BLD AUTO: 0 K/UL
NRBC BLD-RTO: 0 /100 WBC
PH UR STRIP.AUTO: 7.5 [PH] (ref 5–8)
PLATELET # BLD AUTO: 260 K/UL (ref 164–446)
PMV BLD AUTO: 9.1 FL (ref 9–12.9)
POTASSIUM SERPL-SCNC: 4.1 MMOL/L (ref 3.6–5.5)
PROT SERPL-MCNC: 7.5 G/DL (ref 6–8.2)
PROT UR QL STRIP: NEGATIVE MG/DL
RBC # BLD AUTO: 4.35 M/UL (ref 4.7–6.1)
RBC # URNS HPF: ABNORMAL /HPF
RBC UR QL AUTO: NEGATIVE
SODIUM SERPL-SCNC: 140 MMOL/L (ref 135–145)
SP GR UR STRIP.AUTO: 1.02
UROBILINOGEN UR STRIP.AUTO-MCNC: 1 MG/DL
WBC # BLD AUTO: 17 K/UL (ref 4.8–10.8)
WBC #/AREA URNS HPF: ABNORMAL /HPF

## 2022-03-18 PROCEDURE — 99285 EMERGENCY DEPT VISIT HI MDM: CPT

## 2022-03-18 PROCEDURE — G0378 HOSPITAL OBSERVATION PER HR: HCPCS

## 2022-03-18 PROCEDURE — A9270 NON-COVERED ITEM OR SERVICE: HCPCS | Performed by: EMERGENCY MEDICINE

## 2022-03-18 PROCEDURE — 96366 THER/PROPH/DIAG IV INF ADDON: CPT

## 2022-03-18 PROCEDURE — 96376 TX/PRO/DX INJ SAME DRUG ADON: CPT

## 2022-03-18 PROCEDURE — 83690 ASSAY OF LIPASE: CPT

## 2022-03-18 PROCEDURE — 93005 ELECTROCARDIOGRAM TRACING: CPT | Performed by: EMERGENCY MEDICINE

## 2022-03-18 PROCEDURE — 74177 CT ABD & PELVIS W/CONTRAST: CPT

## 2022-03-18 PROCEDURE — 81001 URINALYSIS AUTO W/SCOPE: CPT

## 2022-03-18 PROCEDURE — 80053 COMPREHEN METABOLIC PANEL: CPT

## 2022-03-18 PROCEDURE — 96375 TX/PRO/DX INJ NEW DRUG ADDON: CPT

## 2022-03-18 PROCEDURE — 700102 HCHG RX REV CODE 250 W/ 637 OVERRIDE(OP): Performed by: EMERGENCY MEDICINE

## 2022-03-18 PROCEDURE — 700101 HCHG RX REV CODE 250: Performed by: PHYSICIAN ASSISTANT

## 2022-03-18 PROCEDURE — 700111 HCHG RX REV CODE 636 W/ 250 OVERRIDE (IP): Performed by: EMERGENCY MEDICINE

## 2022-03-18 PROCEDURE — 85025 COMPLETE CBC W/AUTO DIFF WBC: CPT

## 2022-03-18 PROCEDURE — 700105 HCHG RX REV CODE 258: Performed by: EMERGENCY MEDICINE

## 2022-03-18 PROCEDURE — 700117 HCHG RX CONTRAST REV CODE 255: Performed by: EMERGENCY MEDICINE

## 2022-03-18 PROCEDURE — 96365 THER/PROPH/DIAG IV INF INIT: CPT

## 2022-03-18 PROCEDURE — 83735 ASSAY OF MAGNESIUM: CPT

## 2022-03-18 PROCEDURE — 36415 COLL VENOUS BLD VENIPUNCTURE: CPT

## 2022-03-18 PROCEDURE — 84425 ASSAY OF VITAMIN B-1: CPT

## 2022-03-18 RX ORDER — GAUZE BANDAGE 2" X 2"
100 BANDAGE TOPICAL DAILY
Status: DISCONTINUED | OUTPATIENT
Start: 2022-03-19 | End: 2022-03-20 | Stop reason: HOSPADM

## 2022-03-18 RX ORDER — SCOLOPAMINE TRANSDERMAL SYSTEM 1 MG/1
1 PATCH, EXTENDED RELEASE TRANSDERMAL ONCE
Status: DISCONTINUED | OUTPATIENT
Start: 2022-03-18 | End: 2022-03-20 | Stop reason: HOSPADM

## 2022-03-18 RX ORDER — HYDROMORPHONE HYDROCHLORIDE 1 MG/ML
INJECTION, SOLUTION INTRAMUSCULAR; INTRAVENOUS; SUBCUTANEOUS
Status: DISPENSED
Start: 2022-03-18 | End: 2022-03-19

## 2022-03-18 RX ORDER — ONDANSETRON 2 MG/ML
4 INJECTION INTRAMUSCULAR; INTRAVENOUS ONCE
Status: COMPLETED | OUTPATIENT
Start: 2022-03-18 | End: 2022-03-18

## 2022-03-18 RX ORDER — MORPHINE SULFATE 4 MG/ML
4 INJECTION INTRAVENOUS ONCE
Status: COMPLETED | OUTPATIENT
Start: 2022-03-18 | End: 2022-03-18

## 2022-03-18 RX ORDER — VITAMIN B COMPLEX
1000 TABLET ORAL EVERY MORNING
COMMUNITY

## 2022-03-18 RX ORDER — SODIUM CHLORIDE, SODIUM LACTATE, POTASSIUM CHLORIDE, CALCIUM CHLORIDE 600; 310; 30; 20 MG/100ML; MG/100ML; MG/100ML; MG/100ML
1000 INJECTION, SOLUTION INTRAVENOUS ONCE
Status: COMPLETED | OUTPATIENT
Start: 2022-03-18 | End: 2022-03-18

## 2022-03-18 RX ORDER — HYDROMORPHONE HYDROCHLORIDE 1 MG/ML
0.5 INJECTION, SOLUTION INTRAMUSCULAR; INTRAVENOUS; SUBCUTANEOUS EVERY 4 HOURS PRN
Status: DISCONTINUED | OUTPATIENT
Start: 2022-03-18 | End: 2022-03-20 | Stop reason: HOSPADM

## 2022-03-18 RX ORDER — ONDANSETRON 2 MG/ML
4 INJECTION INTRAMUSCULAR; INTRAVENOUS EVERY 6 HOURS PRN
Status: DISCONTINUED | OUTPATIENT
Start: 2022-03-18 | End: 2022-03-20 | Stop reason: HOSPADM

## 2022-03-18 RX ORDER — FOLIC ACID 1 MG/1
1 TABLET ORAL DAILY
Status: DISCONTINUED | OUTPATIENT
Start: 2022-03-19 | End: 2022-03-20 | Stop reason: HOSPADM

## 2022-03-18 RX ORDER — HYDROMORPHONE HYDROCHLORIDE 1 MG/ML
0.5 INJECTION, SOLUTION INTRAMUSCULAR; INTRAVENOUS; SUBCUTANEOUS ONCE
Status: COMPLETED | OUTPATIENT
Start: 2022-03-18 | End: 2022-03-18

## 2022-03-18 RX ORDER — SODIUM CHLORIDE, SODIUM LACTATE, POTASSIUM CHLORIDE, CALCIUM CHLORIDE 600; 310; 30; 20 MG/100ML; MG/100ML; MG/100ML; MG/100ML
INJECTION, SOLUTION INTRAVENOUS CONTINUOUS
Status: DISCONTINUED | OUTPATIENT
Start: 2022-03-18 | End: 2022-03-19

## 2022-03-18 RX ORDER — DEXTROSE AND SODIUM CHLORIDE 5; .45 G/100ML; G/100ML
INJECTION, SOLUTION INTRAVENOUS CONTINUOUS
Status: DISCONTINUED | OUTPATIENT
Start: 2022-03-18 | End: 2022-03-20

## 2022-03-18 RX ORDER — ACETAMINOPHEN 500 MG
500 TABLET ORAL EVERY 6 HOURS PRN
COMMUNITY
End: 2022-04-04

## 2022-03-18 RX ADMIN — IOHEXOL 100 ML: 350 INJECTION, SOLUTION INTRAVENOUS at 17:00

## 2022-03-18 RX ADMIN — HYDROMORPHONE HYDROCHLORIDE 0.5 MG: 1 INJECTION, SOLUTION INTRAMUSCULAR; INTRAVENOUS; SUBCUTANEOUS at 17:04

## 2022-03-18 RX ADMIN — MORPHINE SULFATE 4 MG: 4 INJECTION INTRAVENOUS at 13:58

## 2022-03-18 RX ADMIN — THIAMINE HYDROCHLORIDE: 100 INJECTION, SOLUTION INTRAMUSCULAR; INTRAVENOUS at 21:58

## 2022-03-18 RX ADMIN — SCOPALAMINE 1 PATCH: 1 PATCH, EXTENDED RELEASE TRANSDERMAL at 18:54

## 2022-03-18 RX ADMIN — HYDROMORPHONE HYDROCHLORIDE 0.5 MG: 1 INJECTION, SOLUTION INTRAMUSCULAR; INTRAVENOUS; SUBCUTANEOUS at 20:05

## 2022-03-18 RX ADMIN — DEXTROSE AND SODIUM CHLORIDE: 5; 450 INJECTION, SOLUTION INTRAVENOUS at 18:26

## 2022-03-18 RX ADMIN — ONDANSETRON 4 MG: 2 INJECTION INTRAMUSCULAR; INTRAVENOUS at 13:59

## 2022-03-18 RX ADMIN — ONDANSETRON 4 MG: 2 INJECTION INTRAMUSCULAR; INTRAVENOUS at 21:58

## 2022-03-18 RX ADMIN — SODIUM CHLORIDE, POTASSIUM CHLORIDE, SODIUM LACTATE AND CALCIUM CHLORIDE 1000 ML: 600; 310; 30; 20 INJECTION, SOLUTION INTRAVENOUS at 13:59

## 2022-03-18 RX ADMIN — SODIUM CHLORIDE, POTASSIUM CHLORIDE, SODIUM LACTATE AND CALCIUM CHLORIDE: 600; 310; 30; 20 INJECTION, SOLUTION INTRAVENOUS at 17:03

## 2022-03-18 RX ADMIN — ONDANSETRON 4 MG: 2 INJECTION INTRAMUSCULAR; INTRAVENOUS at 17:03

## 2022-03-18 ASSESSMENT — FIBROSIS 4 INDEX: FIB4 SCORE: 1.16

## 2022-03-18 ASSESSMENT — PAIN DESCRIPTION - PAIN TYPE: TYPE: ACUTE PAIN

## 2022-03-18 NOTE — ED PROVIDER NOTES
ED Provider Note    Scribed for Ivonne Cohen M.D. by Roberto Carlos Kelsey. 3/18/2022  1:44 PM    Primary care provider: LEONEL Jones  Means of arrival: Walk in  History obtained from: Patient  History limited by: None    CHIEF COMPLAINT  Chief Complaint   Patient presents with    Post-Op Complications     On Wednesday patient had surgery for hiatal hernia repair and was discharged yesterday. He has been having difficulty keeping any food or drink down even with zofran at home. His pain is also not under control.        HPI  Sudeep West is a 36 y.o. male who presents to the Emergency Department for evaluation of post op complications following surgery a couple of days ago. Patient underwent surgery for hiatal hernia repair two days ago and was discharged yesterday. States last night, he was only able to take in some chicken broth with some water and some fruit juice. He was in severe pain last night at the surgical site. He has been taking hydrocodone 2.5 mg which has not helped. He was also prescribed zofran which did not help. He stopped the pain medication at 6 AM and was hurting and felt sick. States when he got up this morning, he walked 15 ft and started to feel clammy and ended up passing out and hitting the floor, but he thinks his family member was able to catch him. States he did vomit and did not know how he got there. His bilateral hands and feet feel cold. No reports of any recorded fevers. Last bowel movement was on 3 days ago.    REVIEW OF SYSTEMS  CARDIAC: no chest pain, no palpitations    GI: pain to hernia repair site, nausea, vomiting  Neuro: syncope  Musculoskeletal: no swelling, deformity, pain, or joint swelling  Endocrine: cold hands and feet, clammy. No fevers.  SKIN: no rash, erythema, or contusions   See history of present illness. All other systems are negative.    PAST MEDICAL HISTORY   has a past medical history of Anemia, Bowel habit changes, Heart burn,  "Hiatal hernia, Indigestion, and PONV (postoperative nausea and vomiting).    SURGICAL HISTORY   has a past surgical history that includes eye surgery; other abdominal surgery; other; other; and lap,esophagogast fundoplasty (3/16/2022).    SOCIAL HISTORY  Social History     Tobacco Use    Smoking status: Never Smoker    Smokeless tobacco: Never Used   Vaping Use    Vaping Use: Never used   Substance Use Topics    Alcohol use: Never    Drug use: Not Currently      Social History     Substance and Sexual Activity   Drug Use Not Currently       FAMILY HISTORY  Family History   Problem Relation Age of Onset    Lung Cancer Mother     Lung Disease Father     Abdominal aortic aneurysm Father     Migraines Sister     Seizures Sister        CURRENT MEDICATIONS  Home Medications       Reviewed by Delia Conte R.N. (Registered Nurse) on 03/18/22 at 1250  Med List Status: Partial     Medication Last Dose Status   gabapentin (NEURONTIN) 300 MG Cap  Active   HYDROcodone-acetaminophen 2.5-108 mg/5mL (HYCET) 7.5-325 MG/15ML solution  Active   hyoscyamine (LEVSIN) 0.125 MG tablet  Active   omeprazole (PRILOSEC) 20 MG delayed-release capsule  Active   ondansetron (ZOFRAN ODT) 4 MG TABLET DISPERSIBLE  Active   ondansetron (ZOFRAN) 4 MG Tab tablet  Active                    ALLERGIES  Allergies   Allergen Reactions    Sulfa Drugs        PHYSICAL EXAM  VITAL SIGNS: /81   Pulse (!) 102   Temp 36.9 °C (98.5 °F) (Temporal)   Resp 18   Ht 1.905 m (6' 3\")   Wt 60.4 kg (133 lb 2.5 oz)   SpO2 100%   BMI 16.64 kg/m²   Constitutional: Well developed, Well nourished, No acute distress, Non-toxic appearance.   HEENT: Normocephalic, Atraumatic,  external ears normal, pharynx pink,  Mucous  Membranes dry, No rhinorrhea or mucosal edema  Eyes: PERRL, EOMI, Conjunctiva normal, No discharge.   Neck: Normal range of motion, No tenderness, Supple, No stridor.   Lymphatic: No lymphadenopathy    Cardiovascular: Mild tachycardia. Regular " Rhythm, No murmurs,  rubs, or gallops.   Thorax & Lungs: Lungs clear to auscultation bilaterally, No respiratory distress, No wheezes, rhales or rhonchi, No chest wall tenderness.   Abdomen: Bowel sounds normal, Soft, diffuse tenderness, non distended,  No pulsatile masses., no rebound guarding or peritoneal signs. Incision clean, dry, and intact.  Skin: Warm, Dry, No erythema, No rash,   Back:  No CVA tenderness,  No spinal tenderness, bony crepitance, step offs, or instability.   Neurologic: Alert & oriented clear speech no focal deficits  Extremities: Equal, intact distal pulses, No cyanosis, clubbing or edema,  No tenderness.   Musculoskeletal: Good range of motion in all major joints. No tenderness to palpation or major deformities noted.     DIAGNOSTIC STUDIES / PROCEDURES    LABS  Results for orders placed or performed during the hospital encounter of 03/18/22   CBC WITH DIFFERENTIAL   Result Value Ref Range    WBC 17.0 (H) 4.8 - 10.8 K/uL    RBC 4.35 (L) 4.70 - 6.10 M/uL    Hemoglobin 13.1 (L) 14.0 - 18.0 g/dL    Hematocrit 38.9 (L) 42.0 - 52.0 %    MCV 89.4 81.4 - 97.8 fL    MCH 30.1 27.0 - 33.0 pg    MCHC 33.7 33.7 - 35.3 g/dL    RDW 38.0 35.9 - 50.0 fL    Platelet Count 260 164 - 446 K/uL    MPV 9.1 9.0 - 12.9 fL    Neutrophils-Polys 85.50 (H) 44.00 - 72.00 %    Lymphocytes 4.80 (L) 22.00 - 41.00 %    Monocytes 8.80 0.00 - 13.40 %    Eosinophils 0.00 0.00 - 6.90 %    Basophils 0.20 0.00 - 1.80 %    Immature Granulocytes 0.70 0.00 - 0.90 %    Nucleated RBC 0.00 /100 WBC    Neutrophils (Absolute) 14.52 (H) 1.82 - 7.42 K/uL    Lymphs (Absolute) 0.81 (L) 1.00 - 4.80 K/uL    Monos (Absolute) 1.50 (H) 0.00 - 0.85 K/uL    Eos (Absolute) 0.00 0.00 - 0.51 K/uL    Baso (Absolute) 0.03 0.00 - 0.12 K/uL    Immature Granulocytes (abs) 0.12 (H) 0.00 - 0.11 K/uL    NRBC (Absolute) 0.00 K/uL   COMP METABOLIC PANEL   Result Value Ref Range    Sodium 140 135 - 145 mmol/L    Potassium 4.1 3.6 - 5.5 mmol/L    Chloride 100 96  - 112 mmol/L    Co2 24 20 - 33 mmol/L    Anion Gap 16.0 7.0 - 16.0    Glucose 119 (H) 65 - 99 mg/dL    Bun 9 8 - 22 mg/dL    Creatinine 0.85 0.50 - 1.40 mg/dL    Calcium 9.7 8.5 - 10.5 mg/dL    AST(SGOT) 15 12 - 45 U/L    ALT(SGPT) 10 2 - 50 U/L    Alkaline Phosphatase 61 30 - 99 U/L    Total Bilirubin 1.4 0.1 - 1.5 mg/dL    Albumin 4.4 3.2 - 4.9 g/dL    Total Protein 7.5 6.0 - 8.2 g/dL    Globulin 3.1 1.9 - 3.5 g/dL    A-G Ratio 1.4 g/dL   LIPASE   Result Value Ref Range    Lipase 22 11 - 82 U/L   URINALYSIS    Specimen: Urine, Clean Catch   Result Value Ref Range    Color Yellow     Character Clear     Specific Gravity 1.020 <1.035    Ph 7.5 5.0 - 8.0    Glucose Negative Negative mg/dL    Ketones 80 (A) Negative mg/dL    Protein Negative Negative mg/dL    Bilirubin Negative Negative    Urobilinogen, Urine 1.0 Negative    Nitrite Negative Negative    Leukocyte Esterase Trace (A) Negative    Occult Blood Negative Negative    Micro Urine Req Microscopic    ESTIMATED GFR   Result Value Ref Range    GFR (CKD-EPI) 116 >60 mL/min/1.73 m 2   URINE MICROSCOPIC (W/UA)   Result Value Ref Range    WBC 0-2 (A) /hpf    RBC 2-5 (A) /hpf    Bacteria Negative None /hpf    Epithelial Cells Negative /hpf    Hyaline Cast 0-2 /lpf   EKG   Result Value Ref Range    Report       [facility] Emergency Dept.    Test Date:  2022  Pt Name:    PORTILLO AKBAR                 Department: ER  MRN:        [id number]                      Room:       RD 12  Gender:     Male                         Technician: [id number]  :        1985                   Requested By:KENDALL DIAZ  Order #:    [id number]                    Reading MD: KENDALL DIAZ MD    Measurements  Intervals                                Axis  Rate:       93                           P:          65  MO:         132                          QRS:        75  QRSD:       96                           T:          40  QT:         368  QTc:         458    Interpretive Statements  SINUS RHYTHM  CONSIDER LEFT ATRIAL ABNORMALITY  No previous ECG available for comparison  Electronically Signed On 3- 15:28:50 PDT by KENDALL DIAZ MD         All labs reviewed by me.    EKG  See Labs section. Interpreted by me.     RADIOLOGY  CT-ABDOMEN-PELVIS WITH   Final Result      1.  Pneumoperitoneum and pneumoretroperitoneum consistent with recent surgery although bowel perforation is difficult to entirely exclude.  Appearance of proximal stomach consistent with fundoplication.   2.  No evidence of bowel obstruction.   3.  Small amount of dependent peritoneal fluid, likely postoperative as well.   4.  Minimal bilateral pleural fluid with associated atelectasis, slightly worse on the LEFT.        The radiologist's interpretation of all radiological studies have been reviewed by me.    COURSE & MEDICAL DECISION MAKING  Nursing notes, VS, PMSFHx reviewed in chart.    1:44 PM Patient seen and examined at bedside. Patient will be treated with morphine injection 4 mg, zofran injection 4 mg. Ordered C abdomen-pelvis, CBC with differential, CMP, lipase, urinalysis, EKG to evaluate his symptoms. The differential diagnoses include but are not limited to: syncope, dehydration, failure of surgery, bowel obstruction.    HYDRATION: Based on the patient's presentation of Abnormal Fluid Loss the patient was given IV fluids. IV Hydration was used because oral hydration was not adequate alone. Upon recheck following hydration, the patient was improved.    6:11 PM I spoke with Dr. Carballo he asked me to write holding orders which I have done.  He will follow up with the patient in the hospital.  On recheck the patient is mildly improved but still feels very weak and dehydrated and sore.    DISPOSITION:  Patient will be hospitalized by Dr. Carreon in guarded condition.     FINAL IMPRESSION  1. Post-op pain    2. Syncope, unspecified syncope type    3. Dehydration    4. Leukocytosis, unspecified  type          I, Roberto Carlos Kelsey (Scribe), am scribing for, and in the presence of, Ivonne Cohen M.D..    Electronically signed by: Roberto Carlos Kelsey (Joseibashley), 3/18/2022    IIvonne M.D. personally performed the services described in this documentation, as scribed by Roberto Carlos Kelsey in my presence, and it is both accurate and complete.    The note accurately reflects work and decisions made by me.  Ivonne Cohen M.D.  3/18/2022  6:11 PM

## 2022-03-18 NOTE — ED TRIAGE NOTES
"Chief Complaint   Patient presents with   • Post-Op Complications     On Wednesday patient had surgery for hiatal hernia repair and was discharged yesterday. He has been having difficulty keeping any food or drink down even with zofran at home. His pain is also not under control.      Patient states that he did pass out this morning and he feels like he may again    Patient to triage via wheelchair with a family member, CEDOx4, Appropriate precautions in place.     Explained wait time and triage process. Placed back in lobby. Told to notify ED tech or RN of any changes, verbalized understanding.    /81   Pulse (!) 102   Temp 36.9 °C (98.5 °F) (Temporal)   Resp 18   Ht 1.905 m (6' 3\")   Wt 60.4 kg (133 lb 2.5 oz)   SpO2 100%   BMI 16.64 kg/m²     "

## 2022-03-19 ENCOUNTER — APPOINTMENT (OUTPATIENT)
Dept: RADIOLOGY | Facility: MEDICAL CENTER | Age: 37
End: 2022-03-19
Attending: PHYSICIAN ASSISTANT
Payer: MEDICAID

## 2022-03-19 LAB
ALBUMIN SERPL BCP-MCNC: 3.6 G/DL (ref 3.2–4.9)
ALBUMIN/GLOB SERPL: 1.4 G/DL
ALP SERPL-CCNC: 45 U/L (ref 30–99)
ALT SERPL-CCNC: 9 U/L (ref 2–50)
ANION GAP SERPL CALC-SCNC: 8 MMOL/L (ref 7–16)
AST SERPL-CCNC: 13 U/L (ref 12–45)
BASOPHILS # BLD AUTO: 0.3 % (ref 0–1.8)
BASOPHILS # BLD: 0.03 K/UL (ref 0–0.12)
BILIRUB SERPL-MCNC: 0.9 MG/DL (ref 0.1–1.5)
BUN SERPL-MCNC: 6 MG/DL (ref 8–22)
CALCIUM SERPL-MCNC: 8.7 MG/DL (ref 8.5–10.5)
CHLORIDE SERPL-SCNC: 101 MMOL/L (ref 96–112)
CO2 SERPL-SCNC: 27 MMOL/L (ref 20–33)
CREAT SERPL-MCNC: 0.76 MG/DL (ref 0.5–1.4)
EOSINOPHIL # BLD AUTO: 0.04 K/UL (ref 0–0.51)
EOSINOPHIL NFR BLD: 0.5 % (ref 0–6.9)
ERYTHROCYTE [DISTWIDTH] IN BLOOD BY AUTOMATED COUNT: 38.1 FL (ref 35.9–50)
GFR SERPLBLD CREATININE-BSD FMLA CKD-EPI: 120 ML/MIN/1.73 M 2
GLOBULIN SER CALC-MCNC: 2.6 G/DL (ref 1.9–3.5)
GLUCOSE SERPL-MCNC: 99 MG/DL (ref 65–99)
HCT VFR BLD AUTO: 33.3 % (ref 42–52)
HGB BLD-MCNC: 11.1 G/DL (ref 14–18)
IMM GRANULOCYTES # BLD AUTO: 0.02 K/UL (ref 0–0.11)
IMM GRANULOCYTES NFR BLD AUTO: 0.2 % (ref 0–0.9)
LYMPHOCYTES # BLD AUTO: 1.39 K/UL (ref 1–4.8)
LYMPHOCYTES NFR BLD: 15.7 % (ref 22–41)
MCH RBC QN AUTO: 29.9 PG (ref 27–33)
MCHC RBC AUTO-ENTMCNC: 33.3 G/DL (ref 33.7–35.3)
MCV RBC AUTO: 89.8 FL (ref 81.4–97.8)
MONOCYTES # BLD AUTO: 1.12 K/UL (ref 0–0.85)
MONOCYTES NFR BLD AUTO: 12.6 % (ref 0–13.4)
NEUTROPHILS # BLD AUTO: 6.26 K/UL (ref 1.82–7.42)
NEUTROPHILS NFR BLD: 70.7 % (ref 44–72)
NRBC # BLD AUTO: 0 K/UL
NRBC BLD-RTO: 0 /100 WBC
PLATELET # BLD AUTO: 203 K/UL (ref 164–446)
PMV BLD AUTO: 8.9 FL (ref 9–12.9)
POTASSIUM SERPL-SCNC: 3.5 MMOL/L (ref 3.6–5.5)
PROT SERPL-MCNC: 6.2 G/DL (ref 6–8.2)
RBC # BLD AUTO: 3.71 M/UL (ref 4.7–6.1)
SODIUM SERPL-SCNC: 136 MMOL/L (ref 135–145)
WBC # BLD AUTO: 8.9 K/UL (ref 4.8–10.8)

## 2022-03-19 PROCEDURE — 700105 HCHG RX REV CODE 258: Performed by: EMERGENCY MEDICINE

## 2022-03-19 PROCEDURE — 36415 COLL VENOUS BLD VENIPUNCTURE: CPT

## 2022-03-19 PROCEDURE — 96376 TX/PRO/DX INJ SAME DRUG ADON: CPT

## 2022-03-19 PROCEDURE — 700117 HCHG RX CONTRAST REV CODE 255: Performed by: PHYSICIAN ASSISTANT

## 2022-03-19 PROCEDURE — 700111 HCHG RX REV CODE 636 W/ 250 OVERRIDE (IP): Performed by: EMERGENCY MEDICINE

## 2022-03-19 PROCEDURE — 700111 HCHG RX REV CODE 636 W/ 250 OVERRIDE (IP): Performed by: PHYSICIAN ASSISTANT

## 2022-03-19 PROCEDURE — 85025 COMPLETE CBC W/AUTO DIFF WBC: CPT

## 2022-03-19 PROCEDURE — G0378 HOSPITAL OBSERVATION PER HR: HCPCS

## 2022-03-19 PROCEDURE — 96375 TX/PRO/DX INJ NEW DRUG ADDON: CPT

## 2022-03-19 PROCEDURE — 74240 X-RAY XM UPR GI TRC 1CNTRST: CPT

## 2022-03-19 PROCEDURE — A9270 NON-COVERED ITEM OR SERVICE: HCPCS | Performed by: PHYSICIAN ASSISTANT

## 2022-03-19 PROCEDURE — 80053 COMPREHEN METABOLIC PANEL: CPT

## 2022-03-19 PROCEDURE — 700102 HCHG RX REV CODE 250 W/ 637 OVERRIDE(OP): Performed by: PHYSICIAN ASSISTANT

## 2022-03-19 RX ORDER — DIPHENHYDRAMINE HYDROCHLORIDE 50 MG/ML
25 INJECTION INTRAMUSCULAR; INTRAVENOUS EVERY 6 HOURS PRN
Status: DISCONTINUED | OUTPATIENT
Start: 2022-03-19 | End: 2022-03-20 | Stop reason: HOSPADM

## 2022-03-19 RX ADMIN — ONDANSETRON 4 MG: 2 INJECTION INTRAMUSCULAR; INTRAVENOUS at 14:17

## 2022-03-19 RX ADMIN — HYDROMORPHONE HYDROCHLORIDE 0.5 MG: 1 INJECTION, SOLUTION INTRAMUSCULAR; INTRAVENOUS; SUBCUTANEOUS at 03:58

## 2022-03-19 RX ADMIN — ONDANSETRON 4 MG: 2 INJECTION INTRAMUSCULAR; INTRAVENOUS at 08:07

## 2022-03-19 RX ADMIN — HYDROMORPHONE HYDROCHLORIDE 0.5 MG: 1 INJECTION, SOLUTION INTRAMUSCULAR; INTRAVENOUS; SUBCUTANEOUS at 00:06

## 2022-03-19 RX ADMIN — DEXTROSE AND SODIUM CHLORIDE: 5; 450 INJECTION, SOLUTION INTRAVENOUS at 21:30

## 2022-03-19 RX ADMIN — HYDROMORPHONE HYDROCHLORIDE 0.5 MG: 1 INJECTION, SOLUTION INTRAMUSCULAR; INTRAVENOUS; SUBCUTANEOUS at 12:19

## 2022-03-19 RX ADMIN — THERA TABS 1 TABLET: TAB at 21:20

## 2022-03-19 RX ADMIN — DIPHENHYDRAMINE HYDROCHLORIDE 25 MG: 50 INJECTION, SOLUTION INTRAMUSCULAR; INTRAVENOUS at 21:20

## 2022-03-19 RX ADMIN — FOLIC ACID 1 MG: 1 TABLET ORAL at 21:20

## 2022-03-19 RX ADMIN — THIAMINE HCL TAB 100 MG 100 MG: 100 TAB at 21:19

## 2022-03-19 RX ADMIN — HYDROMORPHONE HYDROCHLORIDE 0.5 MG: 1 INJECTION, SOLUTION INTRAMUSCULAR; INTRAVENOUS; SUBCUTANEOUS at 23:32

## 2022-03-19 RX ADMIN — HYDROMORPHONE HYDROCHLORIDE 0.5 MG: 1 INJECTION, SOLUTION INTRAMUSCULAR; INTRAVENOUS; SUBCUTANEOUS at 08:08

## 2022-03-19 RX ADMIN — IOHEXOL 75 ML: 350 INJECTION, SOLUTION INTRAVENOUS at 09:00

## 2022-03-19 RX ADMIN — HYDROMORPHONE HYDROCHLORIDE 0.5 MG: 1 INJECTION, SOLUTION INTRAMUSCULAR; INTRAVENOUS; SUBCUTANEOUS at 17:30

## 2022-03-19 ASSESSMENT — LIFESTYLE VARIABLES
CONSUMPTION TOTAL: NEGATIVE
HOW MANY TIMES IN THE PAST YEAR HAVE YOU HAD 5 OR MORE DRINKS IN A DAY: 0
TOTAL SCORE: 0
EVER HAD A DRINK FIRST THING IN THE MORNING TO STEADY YOUR NERVES TO GET RID OF A HANGOVER: NO
EVER FELT BAD OR GUILTY ABOUT YOUR DRINKING: NO
TOTAL SCORE: 0
ON A TYPICAL DAY WHEN YOU DRINK ALCOHOL HOW MANY DRINKS DO YOU HAVE: 0
HAVE PEOPLE ANNOYED YOU BY CRITICIZING YOUR DRINKING: NO
TOTAL SCORE: 0
AVERAGE NUMBER OF DAYS PER WEEK YOU HAVE A DRINK CONTAINING ALCOHOL: 0
DOES PATIENT WANT TO STOP DRINKING: NO
HAVE YOU EVER FELT YOU SHOULD CUT DOWN ON YOUR DRINKING: NO
ALCOHOL_USE: NO

## 2022-03-19 ASSESSMENT — FIBROSIS 4 INDEX
FIB4 SCORE: 0.75
FIB4 SCORE: 0.75

## 2022-03-19 ASSESSMENT — PATIENT HEALTH QUESTIONNAIRE - PHQ9
1. LITTLE INTEREST OR PLEASURE IN DOING THINGS: NOT AT ALL
SUM OF ALL RESPONSES TO PHQ9 QUESTIONS 1 AND 2: 0
2. FEELING DOWN, DEPRESSED, IRRITABLE, OR HOPELESS: NOT AT ALL

## 2022-03-19 ASSESSMENT — PAIN DESCRIPTION - PAIN TYPE
TYPE: ACUTE PAIN;SURGICAL PAIN
TYPE: SURGICAL PAIN
TYPE: SURGICAL PAIN

## 2022-03-19 ASSESSMENT — COGNITIVE AND FUNCTIONAL STATUS - GENERAL
SUGGESTED CMS G CODE MODIFIER DAILY ACTIVITY: CH
MOBILITY SCORE: 24
SUGGESTED CMS G CODE MODIFIER MOBILITY: CH
DAILY ACTIVITIY SCORE: 24

## 2022-03-19 NOTE — PROGRESS NOTES
Mr. West returned after recent lap Nissen fundoplication. Pulse 90s, WBC down to 8.9, presently in Xray to evaluate recent surgery. Challenging chronic GI dysmotility and pain. Will await results of UGIS.

## 2022-03-19 NOTE — ED NOTES
0715  Assumed care done. Pt noted having a lot of pain. Informed that I will give him pain medication once its due. Call light within reach.

## 2022-03-19 NOTE — PROGRESS NOTES
Patient seen and examined. Upper GI series examined, normal without obstruction or leakage. Challenging physiology with chronic pain and chronic GI dysmotility challenged with recent surgery.

## 2022-03-19 NOTE — ED NOTES
Med rec completed per patient at bedside.  Allergies reviewed with patient.  No outpatient antibiotics in the last 30 days.  Patient's preferred pharmacy: [facility] in [place].

## 2022-03-19 NOTE — ED NOTES
Assumed report and patient care from Mery FORTUNE. Patient is resting comfortably in bed with no signs of labored breathing or restlessness. Pt attempting to eat dinner, not tolerating. POC discussed. No questions or concerns at this time. Whiteboard updated. Safety measures in place, call light within reach.

## 2022-03-20 VITALS
HEIGHT: 75 IN | SYSTOLIC BLOOD PRESSURE: 108 MMHG | HEART RATE: 74 BPM | TEMPERATURE: 99.3 F | WEIGHT: 130 LBS | DIASTOLIC BLOOD PRESSURE: 76 MMHG | OXYGEN SATURATION: 96 % | BODY MASS INDEX: 16.16 KG/M2 | RESPIRATION RATE: 17 BRPM

## 2022-03-20 LAB
ALBUMIN SERPL BCP-MCNC: 4 G/DL (ref 3.2–4.9)
ALBUMIN/GLOB SERPL: 1.4 G/DL
ALP SERPL-CCNC: 49 U/L (ref 30–99)
ALT SERPL-CCNC: 9 U/L (ref 2–50)
ANION GAP SERPL CALC-SCNC: 12 MMOL/L (ref 7–16)
AST SERPL-CCNC: 15 U/L (ref 12–45)
BILIRUB SERPL-MCNC: 1 MG/DL (ref 0.1–1.5)
BUN SERPL-MCNC: 5 MG/DL (ref 8–22)
CALCIUM SERPL-MCNC: 9.3 MG/DL (ref 8.5–10.5)
CHLORIDE SERPL-SCNC: 103 MMOL/L (ref 96–112)
CO2 SERPL-SCNC: 26 MMOL/L (ref 20–33)
CREAT SERPL-MCNC: 0.72 MG/DL (ref 0.5–1.4)
ERYTHROCYTE [DISTWIDTH] IN BLOOD BY AUTOMATED COUNT: 38.5 FL (ref 35.9–50)
GFR SERPLBLD CREATININE-BSD FMLA CKD-EPI: 122 ML/MIN/1.73 M 2
GLOBULIN SER CALC-MCNC: 2.9 G/DL (ref 1.9–3.5)
GLUCOSE SERPL-MCNC: 109 MG/DL (ref 65–99)
HCT VFR BLD AUTO: 36.5 % (ref 42–52)
HGB BLD-MCNC: 12.1 G/DL (ref 14–18)
MCH RBC QN AUTO: 30 PG (ref 27–33)
MCHC RBC AUTO-ENTMCNC: 33.2 G/DL (ref 33.7–35.3)
MCV RBC AUTO: 90.3 FL (ref 81.4–97.8)
PLATELET # BLD AUTO: 276 K/UL (ref 164–446)
PMV BLD AUTO: 9.1 FL (ref 9–12.9)
POTASSIUM SERPL-SCNC: 3.9 MMOL/L (ref 3.6–5.5)
PROT SERPL-MCNC: 6.9 G/DL (ref 6–8.2)
RBC # BLD AUTO: 4.04 M/UL (ref 4.7–6.1)
SODIUM SERPL-SCNC: 141 MMOL/L (ref 135–145)
WBC # BLD AUTO: 6.6 K/UL (ref 4.8–10.8)

## 2022-03-20 PROCEDURE — 85027 COMPLETE CBC AUTOMATED: CPT

## 2022-03-20 PROCEDURE — 80053 COMPREHEN METABOLIC PANEL: CPT

## 2022-03-20 PROCEDURE — G0378 HOSPITAL OBSERVATION PER HR: HCPCS

## 2022-03-20 PROCEDURE — 700105 HCHG RX REV CODE 258: Performed by: EMERGENCY MEDICINE

## 2022-03-20 PROCEDURE — 36415 COLL VENOUS BLD VENIPUNCTURE: CPT

## 2022-03-20 PROCEDURE — 700111 HCHG RX REV CODE 636 W/ 250 OVERRIDE (IP): Performed by: PHYSICIAN ASSISTANT

## 2022-03-20 PROCEDURE — 96376 TX/PRO/DX INJ SAME DRUG ADON: CPT

## 2022-03-20 PROCEDURE — 700111 HCHG RX REV CODE 636 W/ 250 OVERRIDE (IP): Performed by: EMERGENCY MEDICINE

## 2022-03-20 RX ADMIN — DIPHENHYDRAMINE HYDROCHLORIDE 25 MG: 50 INJECTION, SOLUTION INTRAMUSCULAR; INTRAVENOUS at 03:40

## 2022-03-20 RX ADMIN — HYDROMORPHONE HYDROCHLORIDE 0.5 MG: 1 INJECTION, SOLUTION INTRAMUSCULAR; INTRAVENOUS; SUBCUTANEOUS at 11:47

## 2022-03-20 RX ADMIN — HYDROMORPHONE HYDROCHLORIDE 0.5 MG: 1 INJECTION, SOLUTION INTRAMUSCULAR; INTRAVENOUS; SUBCUTANEOUS at 06:37

## 2022-03-20 RX ADMIN — DEXTROSE AND SODIUM CHLORIDE: 5; 450 INJECTION, SOLUTION INTRAVENOUS at 05:34

## 2022-03-20 RX ADMIN — ONDANSETRON 4 MG: 2 INJECTION INTRAMUSCULAR; INTRAVENOUS at 06:42

## 2022-03-20 ASSESSMENT — PAIN DESCRIPTION - PAIN TYPE: TYPE: SURGICAL PAIN

## 2022-03-20 ASSESSMENT — ENCOUNTER SYMPTOMS
FEVER: 0
DIZZINESS: 0
ABDOMINAL PAIN: 1
CHILLS: 0
VOMITING: 0
COUGH: 0
NAUSEA: 0

## 2022-03-20 NOTE — PROGRESS NOTES
Received report from previous shift RN  Assessment complete.  A&O x 4. Patient calls appropriately.  Patient ambulates with no assist. Bed alarm off.   Patient has 7/10 pain. Pain managed with prescribed medications. C/o itching, medicated per MAR  + nausea, - vomit at this time. Tolerating full liquid diet.  Surgical sites c/d/i with band aids.  + void, + flatus, - BM since PTA.  Patient denies SOB.  Patient ambulating in the hallways independently. Steady gait  Review plan with of care with patient. Call light and personal belongings with in reach. Hourly rounding in place. All needs met at this time.

## 2022-03-20 NOTE — PROGRESS NOTES
Pt admitted to room T 408-1 via transport in [facility] from ED at 1615.    Patient reports pain at 5 on a scale of 0-10. Educated patient regarding pharmacologic and non pharmacologic modalities for pain management. Oriented to room call light and smoking policy.  Reviewed plan of care (equipment, incentive spirometer, sequential compression devices, medications, activity, diet, fall precautions, skin care, and pain) with patient and family. Welcome packet given and reviewed with patient, all questions answered. Education provided on oral hygiene program.     AA&Ox4. Denies CP/SOB.  See 2 RN skin note  Intermittent nausea.  Last BM PTA.  Pt ambulates up self*.    Plan of care discussed, all questions answered. Educated on the importance of calling before getting OOB and pt verbalizes understanding. Educated regarding importance of oral care. Oral care kit at bedside. Call light is within reach, treaded slipper socks on, bed in lowest/ locked position, hourly rounding in place, all needs met at this time

## 2022-03-20 NOTE — CARE PLAN
The patient is Stable - Low risk of patient condition declining or worsening    Shift Goals  Clinical Goals: Control pain, decrease nausea, OOB activity, resolve redness and itching  Patient Goals: Feel better, less pain, no rash  Family Goals: N/A    Progress made toward(s) clinical / shift goals:  Patient pain and nausea as well as itching and redness have been medicated per the MAR. This RN will follow up and continue to provide appropriate interventions    Patient is not progressing towards the following goals:

## 2022-03-20 NOTE — PROGRESS NOTES
Pt IV removed, and pt discharged per order. Pt left via relatives- ambulatory.    Sheryl Mcmahon R.N.

## 2022-03-20 NOTE — PROGRESS NOTES
Surgical Progress Note    Author: Sandra Rick P.A.-C. Date & Time created: 3/20/2022   8:40 AM     Interval Events:  Patient was seen and examined this AM.   Reports a terrible nights sleep and feeling stressed because of this. Historically, when he is under stress, he has GI symptoms including pain and nausea.   Discussed with him and reiterated normal UGI yesterday and labs and vital signs remain normal this AM. Labs pending. Patient requests discharge or to place him into a solitary room so he can sleep. I discussed with him that its not always that easy.      Review of Systems   Constitutional: Negative for chills and fever.   Respiratory: Negative for cough.    Cardiovascular: Negative for chest pain.   Gastrointestinal: Positive for abdominal pain. Negative for nausea and vomiting.   Skin: Negative for rash.   Neurological: Negative for dizziness.     Hemodynamics:  Temp (24hrs), Av.8 °C (98.3 °F), Min:36.6 °C (97.9 °F), Max:37.4 °C (99.3 °F)  Temperature: 37.4 °C (99.3 °F)  Pulse  Av.1  Min: 63  Max: 102   Blood Pressure: 108/76     Respiratory:    Respiration: 17, Pulse Oximetry: 96 %           Neuro:  GCS       Fluids:    Intake/Output Summary (Last 24 hours) at 3/20/2022 0840  Last data filed at 3/20/2022 0741  Gross per 24 hour   Intake 720 ml   Output 0 ml   Net 720 ml     Weight: 59 kg (130 lb)  Current Diet Order   Procedures   • Diet Order Diet: Full Liquid; Miscellaneous modifications: (optional): No Red     Physical Exam  Constitutional:       Appearance: Normal appearance. He is obese.   HENT:      Head: Normocephalic and atraumatic.      Mouth/Throat:      Mouth: Mucous membranes are moist.   Eyes:      Extraocular Movements: Extraocular movements intact.   Cardiovascular:      Rate and Rhythm: Normal rate and regular rhythm.   Pulmonary:      Effort: Pulmonary effort is normal. No respiratory distress.   Abdominal:      General: There is no distension.      Palpations: Abdomen is  soft.      Tenderness: There is abdominal tenderness. There is no rebound.   Musculoskeletal:      Cervical back: Normal range of motion.   Skin:     General: Skin is warm and dry.   Neurological:      General: No focal deficit present.      Mental Status: He is alert.       Labs:  No results found for this or any previous visit (from the past 24 hour(s)).  Medical Decision Making, by Problem:  Active Hospital Problems    Diagnosis    • Post-op pain [G89.18]      Plan:  Pt is alert and oriented, NAD. Breathing unlabored. Tolerating PO.  Incisions ok. VS stable. Labs pending.  Encouraged ambulation and incentive spirometry.  Pt okay for discharge later this afternoon if labs remain normal. Pt case discussed with Dr. Carreon.    Quality Measures:  Quality-Core Measures   Reviewed items::  Labs reviewed  Vieyra catheter::  No Vieyra      Discussed patient condition with Patient and Dr. Carreon

## 2022-03-20 NOTE — PROGRESS NOTES
4 Eyes Skin Assessment Completed by TONG Conn and TONG Lim.    Head WDL  Ears WDL  Nose WDL  Mouth WDL  Neck WDL  Breast/Chest WDL  Shoulder Blades WDL  Spine WDL  (R) Arm/Elbow/Hand WDL  (L) Arm/Elbow/Hand WDL  Abdomen Incision - 5 lap sites with bandaids  Groin WDL  Scrotum/Coccyx/Buttocks WDL  (R) Leg WDL  (L) Leg WDL  (R) Heel/Foot/Toe WDL  (L) Heel/Foot/Toe WDL          Devices In Places Pulse Ox      Interventions In Place Pillows and Pressure Redistribution Mattress    Possible Skin Injury No    Pictures Uploaded Into Epic N/A  Wound Consult Placed N/A  RN Wound Prevention Protocol Ordered No

## 2022-03-20 NOTE — DISCHARGE INSTRUCTIONS
Discharge Instructions    Discharged to home by car with relative. Discharged via wheelchair, hospital escort: Yes.  Special equipment needed: Not Applicable    Be sure to schedule a follow-up appointment with your primary care doctor or any specialists as instructed.     Discharge Plan:   Diet Plan: Discussed  Activity Level: Discussed  Confirmed Follow up Appointment: Patient to Call and Schedule Appointment  Confirmed Symptoms Management: Discussed  Medication Reconciliation Updated: Yes  Influenza Vaccine Indication: Not indicated: Previously immunized this influenza season and > 8 years of age    I understand that a diet low in cholesterol, fat, and sodium is recommended for good health. Unless I have been given specific instructions below for another diet, I accept this instruction as my diet prescription.   Other diet: N/A    Special Instructions: None    · Is patient discharged on Warfarin / Coumadin?   No     Depression / Suicide Risk    As you are discharged from this [facility] facility, it is important to learn how to keep safe from harming yourself.    Recognize the warning signs:  · Abrupt changes in personality, positive or negative- including increase in energy   · Giving away possessions  · Change in eating patterns- significant weight changes-  positive or negative  · Change in sleeping patterns- unable to sleep or sleeping all the time   · Unwillingness or inability to communicate  · Depression  · Unusual sadness, discouragement and loneliness  · Talk of wanting to die  · Neglect of personal appearance   · Rebelliousness- reckless behavior  · Withdrawal from people/activities they love  · Confusion- inability to concentrate     If you or a loved one observes any of these behaviors or has concerns about self-harm, here's what you can do:  · Talk about it- your feelings and reasons for harming yourself  · Remove any means that you might use to hurt yourself (examples: pills, rope, extension  cords, firearm)  · Get professional help from the community (Mental Health, Substance Abuse, psychological counseling)  · Do not be alone:Call your Safe Contact- someone whom you trust who will be there for you.  · Call your local CRISIS HOTLINE 568-6678 or 930-111-2351  · Call your local Children's Mobile Crisis Response Team [place] (345) 228-6852 or www.StayClassy  · Call the toll free National Suicide Prevention Hotlines   · National Suicide Prevention Lifeline 038-969-QWIY (8661)  · National Hope Line Network 800-SUICIDE (741-4624)

## 2022-03-20 NOTE — CARE PLAN
Problem: Pain - Standard  Goal: Alleviation of pain or a reduction in pain to the patient’s comfort goal  Outcome: Progressing     Problem: Knowledge Deficit - Standard  Goal: Patient and family/care givers will demonstrate understanding of plan of care, disease process/condition, diagnostic tests and medications  Outcome: Progressing   The patient is Stable - Low risk of patient condition declining or worsening    Shift Goals  Clinical Goals: pain control, possible dc today  Patient Goals: pain control  Family Goals: N/A    Progress made toward(s) clinical / shift goals: Medications will be give for pain control. Pt supposed to dc today.    Patient is not progressing towards the following goals: N/A    Sheryl Mcmahon R.N.

## 2022-03-20 NOTE — PROGRESS NOTES
"Pt complains of itching and redness in hands, distal arms, and abdomen. No new drug admin in the past couple of hours. Only known systemic drug allergy is to sulfa drugs. Running D1/2NS at 125. Attempting to contact providers office at this time.    Spoke with Dr. Carreon's PA who ordered IV benadryl. By the time benadryl was administered, itching had progressed to patient's legs and feet. Patient states, \"I wonder if this is happening from anxiety of being back in the hospital, this happened a little bit last time too\"    Will continue to closely monitor O2 and VS  "

## 2022-03-23 NOTE — H&P
DATE OF ADMISSION:  03/18/2022     CHIEF COMPLAINT:  1.  Abdominal and chest pain.  2.  Vomiting and dehydration.     HISTORY OF PRESENT ILLNESS:  The patient is a 36-year-old male with a history   of chronic pain and chronic gastrointestinal dysmotility and longstanding pan   intestinal symptoms.  He recently had undergone a laparoscopic Nissen   fundoplication for a highly symptomatic hiatal hernia with acid reflux.  He   appeared to be tolerating liquid oral intake and was discharged home, but   began having increasing abdominal, chest and body pain and describes that he   was not able to tolerate oral intake and presented to the emergency room.  He   denies fevers, chills, hematemesis or diarrhea.     PAST MEDICAL HISTORY:   1.  A recent laparoscopic Nissen fundoplication, hiatal hernia repair.  2.  Chronic pain.  3.  Chronic pan intestinal dysmotility.  4.  Anemia.     MEDICATIONS:  Prilosec, Neurontin, vitamin D3, Tylenol, Lortab.     ALLERGIES:  SULFA, NONSTEROIDALS.     SOCIAL HISTORY:  Denies tobacco or alcohol abuse.     FAMILY HISTORY:  Denies a family history of inflammatory bowel disease.     REVIEW OF SYSTEMS:  NEUROLOGIC:  Denies strokes or seizures.  CARDIAC:  Denies heart attacks, chest pain, congenital anomalies.  LUNGS:  Denies shortness of breath, coughing, wheezing, hemoptysis.  GASTROINTESTINAL:  Chronic lifelong gastrointestinal symptoms with abdominal   pain, constipation, diarrhea, severe cramping, wide open reflux with   regurgitation.  GENITOURINARY:  Denies dysuria or hematuria.  MUSCULOSKELETAL:  Denies fractures, arthroplasty.  EYES:  No recent visual changes.  EARS:  No hearing aids, hearing loss, balance troubles.     PHYSICAL EXAMINATION:  GENERAL:  Nontoxic comfortable-appearing, no acute distress.  HEENT:  Eyes:  Anicteric.  Extraocular movements intact.  Nose and ears   externally normal.  Hearing grossly normal.  Oropharynx clear.  NECK:  Supple.  Good range of motion.  CHEST AND  RESPIRATORY:  Unlabored breathing, normal excursions.  CARDIOVASCULAR:  Regular rate and rhythm.  ABDOMEN:  Soft, nondistended.  Recent laparoscopy sites all appear within   normal limits with Band-Aids.  EXTREMITIES:  Warm and acyanotic.  No edema.  NEUROLOGIC:  Nonfocal with normal power and strength throughout.  Mood, affect   and judgment appear within normal limits.     LABORATORY DATA:  White blood count of 17,000, I believe.  The remainder of   the laboratory studies reviewed as depicted.     DIAGNOSTIC DATA:  Upper GI study done, which shows a normal-appearing   postsurgical changes of recent Nissen fundoplication without obstruction or   extravasation.     IMPRESSION:  1.  Abdominal, chest pain and vomiting.  2.  Recent hiatal hernia repair surgery.  3.  Chronic gastrointestinal symptoms and dysmotility.  4.  Chronic pain.     PLAN:  I talked at length with the patient about the nature of his symptoms,   the nature of the findings.  We discussed admission, intravenous hydration,   gradual resumption of diet, activity and symptomatic management.  He requested   narcotics and antiemetics and I felt that he would get better with some   additional time in symptom relief.  I concur, but we will watch him closely   for signs of any other trouble.  His questions were answered in full.        ______________________________  MD CHING STARKEY/JEFF/CARLY    DD:  03/23/2022 10:40  DT:  03/23/2022 11:18    Job#:  [id number]

## 2022-03-26 LAB — VIT B1 BLD-MCNC: 112 NMOL/L (ref 70–180)

## 2022-04-04 ENCOUNTER — HOSPITAL ENCOUNTER (INPATIENT)
Facility: MEDICAL CENTER | Age: 37
LOS: 2 days | DRG: 326 | End: 2022-04-08
Attending: EMERGENCY MEDICINE | Admitting: INTERNAL MEDICINE
Payer: MEDICAID

## 2022-04-04 ENCOUNTER — APPOINTMENT (OUTPATIENT)
Dept: RADIOLOGY | Facility: MEDICAL CENTER | Age: 37
DRG: 326 | End: 2022-04-04
Attending: INTERNAL MEDICINE
Payer: MEDICAID

## 2022-04-04 DIAGNOSIS — R11.0 NAUSEA: ICD-10-CM

## 2022-04-04 DIAGNOSIS — R10.9 ABDOMINAL PAIN, UNSPECIFIED ABDOMINAL LOCATION: ICD-10-CM

## 2022-04-04 DIAGNOSIS — E43 SEVERE PROTEIN-CALORIE MALNUTRITION (HCC): ICD-10-CM

## 2022-04-04 DIAGNOSIS — G89.18 POST-OP PAIN: ICD-10-CM

## 2022-04-04 LAB
ALBUMIN SERPL BCP-MCNC: 4.9 G/DL (ref 3.2–4.9)
ALBUMIN/GLOB SERPL: 1.5 G/DL
ALP SERPL-CCNC: 57 U/L (ref 30–99)
ALT SERPL-CCNC: 14 U/L (ref 2–50)
ANION GAP SERPL CALC-SCNC: 15 MMOL/L (ref 7–16)
APPEARANCE UR: CLEAR
AST SERPL-CCNC: 21 U/L (ref 12–45)
BASOPHILS # BLD AUTO: 1 % (ref 0–1.8)
BASOPHILS # BLD: 0.09 K/UL (ref 0–0.12)
BILIRUB SERPL-MCNC: 0.5 MG/DL (ref 0.1–1.5)
BILIRUB UR QL STRIP.AUTO: NEGATIVE
BUN SERPL-MCNC: 13 MG/DL (ref 8–22)
CALCIUM SERPL-MCNC: 9.8 MG/DL (ref 8.5–10.5)
CHLORIDE SERPL-SCNC: 104 MMOL/L (ref 96–112)
CO2 SERPL-SCNC: 22 MMOL/L (ref 20–33)
COLOR UR: YELLOW
CREAT SERPL-MCNC: 0.81 MG/DL (ref 0.5–1.4)
EOSINOPHIL # BLD AUTO: 0.13 K/UL (ref 0–0.51)
EOSINOPHIL NFR BLD: 1.4 % (ref 0–6.9)
ERYTHROCYTE [DISTWIDTH] IN BLOOD BY AUTOMATED COUNT: 39.6 FL (ref 35.9–50)
GFR SERPLBLD CREATININE-BSD FMLA CKD-EPI: 117 ML/MIN/1.73 M 2
GLOBULIN SER CALC-MCNC: 3.3 G/DL (ref 1.9–3.5)
GLUCOSE SERPL-MCNC: 100 MG/DL (ref 65–99)
GLUCOSE UR STRIP.AUTO-MCNC: NEGATIVE MG/DL
HCT VFR BLD AUTO: 42.1 % (ref 42–52)
HGB BLD-MCNC: 13.8 G/DL (ref 14–18)
IMM GRANULOCYTES # BLD AUTO: 0.01 K/UL (ref 0–0.11)
IMM GRANULOCYTES NFR BLD AUTO: 0.1 % (ref 0–0.9)
KETONES UR STRIP.AUTO-MCNC: ABNORMAL MG/DL
LACTATE BLD-SCNC: 1.4 MMOL/L (ref 0.5–2)
LEUKOCYTE ESTERASE UR QL STRIP.AUTO: NEGATIVE
LIPASE SERPL-CCNC: 58 U/L (ref 11–82)
LYMPHOCYTES # BLD AUTO: 3.33 K/UL (ref 1–4.8)
LYMPHOCYTES NFR BLD: 35.8 % (ref 22–41)
MCH RBC QN AUTO: 29.6 PG (ref 27–33)
MCHC RBC AUTO-ENTMCNC: 32.8 G/DL (ref 33.7–35.3)
MCV RBC AUTO: 90.3 FL (ref 81.4–97.8)
MICRO URNS: ABNORMAL
MONOCYTES # BLD AUTO: 0.75 K/UL (ref 0–0.85)
MONOCYTES NFR BLD AUTO: 8.1 % (ref 0–13.4)
NEUTROPHILS # BLD AUTO: 5 K/UL (ref 1.82–7.42)
NEUTROPHILS NFR BLD: 53.6 % (ref 44–72)
NITRITE UR QL STRIP.AUTO: NEGATIVE
NRBC # BLD AUTO: 0 K/UL
NRBC BLD-RTO: 0 /100 WBC
PH UR STRIP.AUTO: 7 [PH] (ref 5–8)
PHOSPHATE SERPL-MCNC: 2.9 MG/DL (ref 2.5–4.5)
PLATELET # BLD AUTO: 437 K/UL (ref 164–446)
PMV BLD AUTO: 8.9 FL (ref 9–12.9)
POTASSIUM SERPL-SCNC: 4.1 MMOL/L (ref 3.6–5.5)
PROT SERPL-MCNC: 8.2 G/DL (ref 6–8.2)
PROT UR QL STRIP: NEGATIVE MG/DL
RBC # BLD AUTO: 4.66 M/UL (ref 4.7–6.1)
RBC UR QL AUTO: NEGATIVE
SODIUM SERPL-SCNC: 141 MMOL/L (ref 135–145)
SP GR UR STRIP.AUTO: 1.02
UROBILINOGEN UR STRIP.AUTO-MCNC: 1 MG/DL
WBC # BLD AUTO: 9.3 K/UL (ref 4.8–10.8)

## 2022-04-04 PROCEDURE — 99285 EMERGENCY DEPT VISIT HI MDM: CPT

## 2022-04-04 PROCEDURE — 83605 ASSAY OF LACTIC ACID: CPT

## 2022-04-04 PROCEDURE — 80053 COMPREHEN METABOLIC PANEL: CPT

## 2022-04-04 PROCEDURE — 96375 TX/PRO/DX INJ NEW DRUG ADDON: CPT

## 2022-04-04 PROCEDURE — C9113 INJ PANTOPRAZOLE SODIUM, VIA: HCPCS | Performed by: INTERNAL MEDICINE

## 2022-04-04 PROCEDURE — 96376 TX/PRO/DX INJ SAME DRUG ADON: CPT

## 2022-04-04 PROCEDURE — 99219 PR INITIAL OBSERVATION CARE,LEVL II: CPT | Performed by: INTERNAL MEDICINE

## 2022-04-04 PROCEDURE — 74018 RADEX ABDOMEN 1 VIEW: CPT

## 2022-04-04 PROCEDURE — 85025 COMPLETE CBC W/AUTO DIFF WBC: CPT

## 2022-04-04 PROCEDURE — 83690 ASSAY OF LIPASE: CPT

## 2022-04-04 PROCEDURE — 700111 HCHG RX REV CODE 636 W/ 250 OVERRIDE (IP): Performed by: INTERNAL MEDICINE

## 2022-04-04 PROCEDURE — 84100 ASSAY OF PHOSPHORUS: CPT

## 2022-04-04 PROCEDURE — 36415 COLL VENOUS BLD VENIPUNCTURE: CPT

## 2022-04-04 PROCEDURE — 96365 THER/PROPH/DIAG IV INF INIT: CPT

## 2022-04-04 PROCEDURE — 81003 URINALYSIS AUTO W/O SCOPE: CPT

## 2022-04-04 PROCEDURE — 700105 HCHG RX REV CODE 258: Performed by: EMERGENCY MEDICINE

## 2022-04-04 PROCEDURE — 700105 HCHG RX REV CODE 258: Performed by: INTERNAL MEDICINE

## 2022-04-04 PROCEDURE — 700111 HCHG RX REV CODE 636 W/ 250 OVERRIDE (IP): Performed by: EMERGENCY MEDICINE

## 2022-04-04 PROCEDURE — G0378 HOSPITAL OBSERVATION PER HR: HCPCS

## 2022-04-04 RX ORDER — PROMETHAZINE HYDROCHLORIDE 25 MG/1
12.5-25 TABLET ORAL EVERY 4 HOURS PRN
Status: DISCONTINUED | OUTPATIENT
Start: 2022-04-04 | End: 2022-04-08 | Stop reason: HOSPADM

## 2022-04-04 RX ORDER — MORPHINE SULFATE 4 MG/ML
4 INJECTION INTRAVENOUS
Status: DISCONTINUED | OUTPATIENT
Start: 2022-04-04 | End: 2022-04-04

## 2022-04-04 RX ORDER — POLYETHYLENE GLYCOL 3350 17 G/17G
1 POWDER, FOR SOLUTION ORAL
Status: DISCONTINUED | OUTPATIENT
Start: 2022-04-04 | End: 2022-04-08 | Stop reason: HOSPADM

## 2022-04-04 RX ORDER — SUCRALFATE 1 G/1
1 TABLET ORAL EVERY 6 HOURS
Status: DISCONTINUED | OUTPATIENT
Start: 2022-04-04 | End: 2022-04-05

## 2022-04-04 RX ORDER — MORPHINE SULFATE 4 MG/ML
4 INJECTION INTRAVENOUS
Status: DISCONTINUED | OUTPATIENT
Start: 2022-04-04 | End: 2022-04-05

## 2022-04-04 RX ORDER — ONDANSETRON 2 MG/ML
4 INJECTION INTRAMUSCULAR; INTRAVENOUS ONCE
Status: COMPLETED | OUTPATIENT
Start: 2022-04-04 | End: 2022-04-04

## 2022-04-04 RX ORDER — ONDANSETRON 2 MG/ML
4 INJECTION INTRAMUSCULAR; INTRAVENOUS EVERY 4 HOURS PRN
Status: DISCONTINUED | OUTPATIENT
Start: 2022-04-04 | End: 2022-04-08 | Stop reason: HOSPADM

## 2022-04-04 RX ORDER — SODIUM CHLORIDE 9 MG/ML
INJECTION, SOLUTION INTRAVENOUS CONTINUOUS
Status: DISCONTINUED | OUTPATIENT
Start: 2022-04-05 | End: 2022-04-08 | Stop reason: HOSPADM

## 2022-04-04 RX ORDER — SODIUM CHLORIDE, SODIUM LACTATE, POTASSIUM CHLORIDE, CALCIUM CHLORIDE 600; 310; 30; 20 MG/100ML; MG/100ML; MG/100ML; MG/100ML
1000 INJECTION, SOLUTION INTRAVENOUS ONCE
Status: COMPLETED | OUTPATIENT
Start: 2022-04-04 | End: 2022-04-04

## 2022-04-04 RX ORDER — BISACODYL 10 MG
10 SUPPOSITORY, RECTAL RECTAL
Status: DISCONTINUED | OUTPATIENT
Start: 2022-04-04 | End: 2022-04-08 | Stop reason: HOSPADM

## 2022-04-04 RX ORDER — LABETALOL HYDROCHLORIDE 5 MG/ML
10 INJECTION, SOLUTION INTRAVENOUS EVERY 4 HOURS PRN
Status: DISCONTINUED | OUTPATIENT
Start: 2022-04-04 | End: 2022-04-08 | Stop reason: HOSPADM

## 2022-04-04 RX ORDER — ONDANSETRON 4 MG/1
4 TABLET, ORALLY DISINTEGRATING ORAL EVERY 4 HOURS PRN
Status: DISCONTINUED | OUTPATIENT
Start: 2022-04-04 | End: 2022-04-08 | Stop reason: HOSPADM

## 2022-04-04 RX ORDER — GABAPENTIN 300 MG/1
300 CAPSULE ORAL
Status: DISCONTINUED | OUTPATIENT
Start: 2022-04-04 | End: 2022-04-08 | Stop reason: HOSPADM

## 2022-04-04 RX ORDER — ACETAMINOPHEN 160 MG/5ML
30 SUSPENSION ORAL EVERY 4 HOURS PRN
COMMUNITY

## 2022-04-04 RX ORDER — ACETAMINOPHEN 325 MG/1
650 TABLET ORAL EVERY 6 HOURS PRN
Status: DISCONTINUED | OUTPATIENT
Start: 2022-04-04 | End: 2022-04-08 | Stop reason: HOSPADM

## 2022-04-04 RX ORDER — VITAMIN B COMPLEX
1000 TABLET ORAL EVERY MORNING
Status: DISCONTINUED | OUTPATIENT
Start: 2022-04-05 | End: 2022-04-08 | Stop reason: HOSPADM

## 2022-04-04 RX ORDER — PANTOPRAZOLE SODIUM 40 MG/10ML
40 INJECTION, POWDER, LYOPHILIZED, FOR SOLUTION INTRAVENOUS DAILY
Status: DISCONTINUED | OUTPATIENT
Start: 2022-04-04 | End: 2022-04-07

## 2022-04-04 RX ORDER — METOCLOPRAMIDE HYDROCHLORIDE 5 MG/ML
10 INJECTION INTRAMUSCULAR; INTRAVENOUS EVERY 6 HOURS
Status: DISPENSED | OUTPATIENT
Start: 2022-04-04 | End: 2022-04-07

## 2022-04-04 RX ORDER — OXYCODONE HYDROCHLORIDE 5 MG/1
10 TABLET ORAL
Status: DISCONTINUED | OUTPATIENT
Start: 2022-04-04 | End: 2022-04-05

## 2022-04-04 RX ORDER — HYOSCYAMINE SULFATE 0.12 MG/ML
0.12 LIQUID ORAL EVERY 4 HOURS PRN
Status: DISCONTINUED | OUTPATIENT
Start: 2022-04-04 | End: 2022-04-08 | Stop reason: HOSPADM

## 2022-04-04 RX ORDER — PROMETHAZINE HYDROCHLORIDE 25 MG/1
12.5-25 SUPPOSITORY RECTAL EVERY 4 HOURS PRN
Status: DISCONTINUED | OUTPATIENT
Start: 2022-04-04 | End: 2022-04-08 | Stop reason: HOSPADM

## 2022-04-04 RX ORDER — SODIUM CHLORIDE, SODIUM LACTATE, POTASSIUM CHLORIDE, CALCIUM CHLORIDE 600; 310; 30; 20 MG/100ML; MG/100ML; MG/100ML; MG/100ML
1000 INJECTION, SOLUTION INTRAVENOUS ONCE
Status: ACTIVE | OUTPATIENT
Start: 2022-04-04 | End: 2022-04-05

## 2022-04-04 RX ORDER — PROCHLORPERAZINE EDISYLATE 5 MG/ML
5-10 INJECTION INTRAMUSCULAR; INTRAVENOUS EVERY 4 HOURS PRN
Status: DISCONTINUED | OUTPATIENT
Start: 2022-04-04 | End: 2022-04-08 | Stop reason: HOSPADM

## 2022-04-04 RX ORDER — SCOLOPAMINE TRANSDERMAL SYSTEM 1 MG/1
1 PATCH, EXTENDED RELEASE TRANSDERMAL
Status: DISCONTINUED | OUTPATIENT
Start: 2022-04-05 | End: 2022-04-08 | Stop reason: HOSPADM

## 2022-04-04 RX ORDER — AMOXICILLIN 250 MG
2 CAPSULE ORAL 2 TIMES DAILY
Status: DISCONTINUED | OUTPATIENT
Start: 2022-04-04 | End: 2022-04-08 | Stop reason: HOSPADM

## 2022-04-04 RX ORDER — OXYCODONE HYDROCHLORIDE 5 MG/1
5 TABLET ORAL
Status: DISCONTINUED | OUTPATIENT
Start: 2022-04-04 | End: 2022-04-05

## 2022-04-04 RX ADMIN — PROCHLORPERAZINE EDISYLATE 10 MG: 5 INJECTION INTRAMUSCULAR; INTRAVENOUS at 22:43

## 2022-04-04 RX ADMIN — MORPHINE SULFATE 4 MG: 4 INJECTION INTRAVENOUS at 21:58

## 2022-04-04 RX ADMIN — SODIUM CHLORIDE, POTASSIUM CHLORIDE, SODIUM LACTATE AND CALCIUM CHLORIDE 1000 ML: 600; 310; 30; 20 INJECTION, SOLUTION INTRAVENOUS at 19:04

## 2022-04-04 RX ADMIN — MORPHINE SULFATE 4 MG: 4 INJECTION INTRAVENOUS at 19:06

## 2022-04-04 RX ADMIN — ONDANSETRON 4 MG: 2 INJECTION INTRAMUSCULAR; INTRAVENOUS at 19:06

## 2022-04-04 RX ADMIN — PANTOPRAZOLE SODIUM 40 MG: 40 INJECTION, POWDER, FOR SOLUTION INTRAVENOUS at 20:27

## 2022-04-04 RX ADMIN — METOCLOPRAMIDE 10 MG: 5 INJECTION, SOLUTION INTRAMUSCULAR; INTRAVENOUS at 21:57

## 2022-04-04 RX ADMIN — THIAMINE HYDROCHLORIDE 100 MG: 100 INJECTION, SOLUTION INTRAMUSCULAR; INTRAVENOUS at 21:04

## 2022-04-04 ASSESSMENT — PAIN DESCRIPTION - PAIN TYPE
TYPE: ACUTE PAIN
TYPE: CHRONIC PAIN;SURGICAL PAIN

## 2022-04-04 ASSESSMENT — ENCOUNTER SYMPTOMS
ORTHOPNEA: 0
NAUSEA: 1
VOMITING: 1
PHOTOPHOBIA: 0
BACK PAIN: 0
PALPITATIONS: 0
BLURRED VISION: 0
SPUTUM PRODUCTION: 0
TREMORS: 0
POLYDIPSIA: 0
NERVOUS/ANXIOUS: 0
ABDOMINAL PAIN: 1
SPEECH CHANGE: 0
DOUBLE VISION: 0
NECK PAIN: 0
BRUISES/BLEEDS EASILY: 0
HEADACHES: 0
WEIGHT LOSS: 1
HEMOPTYSIS: 0
CHILLS: 0
COUGH: 0
DIARRHEA: 0
CONSTIPATION: 0
FLANK PAIN: 0
HALLUCINATIONS: 0
FOCAL WEAKNESS: 0
FEVER: 0
HEARTBURN: 1

## 2022-04-04 ASSESSMENT — FIBROSIS 4 INDEX: FIB4 SCORE: 0.63

## 2022-04-04 ASSESSMENT — LIFESTYLE VARIABLES
DO YOU DRINK ALCOHOL: NO
SUBSTANCE_ABUSE: 0

## 2022-04-04 ASSESSMENT — PAIN DESCRIPTION - DESCRIPTORS: DESCRIPTORS: ACHING

## 2022-04-05 ENCOUNTER — PATIENT OUTREACH (OUTPATIENT)
Dept: HEALTH INFORMATION MANAGEMENT | Facility: OTHER | Age: 37
End: 2022-04-05
Payer: MEDICAID

## 2022-04-05 LAB
ALBUMIN SERPL BCP-MCNC: 3.9 G/DL (ref 3.2–4.9)
ALBUMIN/GLOB SERPL: 1.6 G/DL
ALP SERPL-CCNC: 42 U/L (ref 30–99)
ALT SERPL-CCNC: 6 U/L (ref 2–50)
ANION GAP SERPL CALC-SCNC: 11 MMOL/L (ref 7–16)
AST SERPL-CCNC: 13 U/L (ref 12–45)
BASOPHILS # BLD AUTO: 1 % (ref 0–1.8)
BASOPHILS # BLD: 0.06 K/UL (ref 0–0.12)
BILIRUB SERPL-MCNC: 0.7 MG/DL (ref 0.1–1.5)
BUN SERPL-MCNC: 11 MG/DL (ref 8–22)
CALCIUM SERPL-MCNC: 9 MG/DL (ref 8.5–10.5)
CHLORIDE SERPL-SCNC: 106 MMOL/L (ref 96–112)
CO2 SERPL-SCNC: 23 MMOL/L (ref 20–33)
CREAT SERPL-MCNC: 0.74 MG/DL (ref 0.5–1.4)
EOSINOPHIL # BLD AUTO: 0.03 K/UL (ref 0–0.51)
EOSINOPHIL NFR BLD: 0.5 % (ref 0–6.9)
ERYTHROCYTE [DISTWIDTH] IN BLOOD BY AUTOMATED COUNT: 39.8 FL (ref 35.9–50)
EXTERNAL QUALITY CONTROL: NORMAL
GFR SERPLBLD CREATININE-BSD FMLA CKD-EPI: 121 ML/MIN/1.73 M 2
GLOBULIN SER CALC-MCNC: 2.4 G/DL (ref 1.9–3.5)
GLUCOSE SERPL-MCNC: 136 MG/DL (ref 65–99)
HCT VFR BLD AUTO: 35.2 % (ref 42–52)
HGB BLD-MCNC: 11.4 G/DL (ref 14–18)
IMM GRANULOCYTES # BLD AUTO: 0.03 K/UL (ref 0–0.11)
IMM GRANULOCYTES NFR BLD AUTO: 0.5 % (ref 0–0.9)
LYMPHOCYTES # BLD AUTO: 1.41 K/UL (ref 1–4.8)
LYMPHOCYTES NFR BLD: 23.9 % (ref 22–41)
MCH RBC QN AUTO: 29.5 PG (ref 27–33)
MCHC RBC AUTO-ENTMCNC: 32.4 G/DL (ref 33.7–35.3)
MCV RBC AUTO: 91.2 FL (ref 81.4–97.8)
MONOCYTES # BLD AUTO: 0.52 K/UL (ref 0–0.85)
MONOCYTES NFR BLD AUTO: 8.8 % (ref 0–13.4)
NEUTROPHILS # BLD AUTO: 3.84 K/UL (ref 1.82–7.42)
NEUTROPHILS NFR BLD: 65.3 % (ref 44–72)
NRBC # BLD AUTO: 0 K/UL
NRBC BLD-RTO: 0 /100 WBC
PLATELET # BLD AUTO: 321 K/UL (ref 164–446)
PMV BLD AUTO: 8.9 FL (ref 9–12.9)
POTASSIUM SERPL-SCNC: 3.8 MMOL/L (ref 3.6–5.5)
PROT SERPL-MCNC: 6.3 G/DL (ref 6–8.2)
RBC # BLD AUTO: 3.86 M/UL (ref 4.7–6.1)
SARS-COV+SARS-COV-2 AG RESP QL IA.RAPID: NEGATIVE
SODIUM SERPL-SCNC: 140 MMOL/L (ref 135–145)
WBC # BLD AUTO: 5.9 K/UL (ref 4.8–10.8)

## 2022-04-05 PROCEDURE — G0378 HOSPITAL OBSERVATION PER HR: HCPCS

## 2022-04-05 PROCEDURE — A9270 NON-COVERED ITEM OR SERVICE: HCPCS | Performed by: INTERNAL MEDICINE

## 2022-04-05 PROCEDURE — 700102 HCHG RX REV CODE 250 W/ 637 OVERRIDE(OP): Performed by: INTERNAL MEDICINE

## 2022-04-05 PROCEDURE — 96376 TX/PRO/DX INJ SAME DRUG ADON: CPT

## 2022-04-05 PROCEDURE — 96372 THER/PROPH/DIAG INJ SC/IM: CPT

## 2022-04-05 PROCEDURE — 700102 HCHG RX REV CODE 250 W/ 637 OVERRIDE(OP): Performed by: STUDENT IN AN ORGANIZED HEALTH CARE EDUCATION/TRAINING PROGRAM

## 2022-04-05 PROCEDURE — 51798 US URINE CAPACITY MEASURE: CPT

## 2022-04-05 PROCEDURE — 700111 HCHG RX REV CODE 636 W/ 250 OVERRIDE (IP): Performed by: INTERNAL MEDICINE

## 2022-04-05 PROCEDURE — 87426 SARSCOV CORONAVIRUS AG IA: CPT | Performed by: COLON & RECTAL SURGERY

## 2022-04-05 PROCEDURE — 85025 COMPLETE CBC W/AUTO DIFF WBC: CPT

## 2022-04-05 PROCEDURE — 700111 HCHG RX REV CODE 636 W/ 250 OVERRIDE (IP): Performed by: STUDENT IN AN ORGANIZED HEALTH CARE EDUCATION/TRAINING PROGRAM

## 2022-04-05 PROCEDURE — 96375 TX/PRO/DX INJ NEW DRUG ADDON: CPT

## 2022-04-05 PROCEDURE — 80053 COMPREHEN METABOLIC PANEL: CPT

## 2022-04-05 PROCEDURE — 99225 PR SUBSEQUENT OBSERVATION CARE,LEVEL II: CPT | Performed by: STUDENT IN AN ORGANIZED HEALTH CARE EDUCATION/TRAINING PROGRAM

## 2022-04-05 PROCEDURE — 700105 HCHG RX REV CODE 258: Performed by: INTERNAL MEDICINE

## 2022-04-05 PROCEDURE — 36415 COLL VENOUS BLD VENIPUNCTURE: CPT

## 2022-04-05 PROCEDURE — A9270 NON-COVERED ITEM OR SERVICE: HCPCS | Performed by: STUDENT IN AN ORGANIZED HEALTH CARE EDUCATION/TRAINING PROGRAM

## 2022-04-05 RX ORDER — HYDROMORPHONE HYDROCHLORIDE 1 MG/ML
0.5 INJECTION, SOLUTION INTRAMUSCULAR; INTRAVENOUS; SUBCUTANEOUS
Status: DISCONTINUED | OUTPATIENT
Start: 2022-04-05 | End: 2022-04-08 | Stop reason: HOSPADM

## 2022-04-05 RX ORDER — DIPHENHYDRAMINE HYDROCHLORIDE 50 MG/ML
25 INJECTION INTRAMUSCULAR; INTRAVENOUS ONCE
Status: ACTIVE | OUTPATIENT
Start: 2022-04-05 | End: 2022-04-06

## 2022-04-05 RX ORDER — LORAZEPAM 2 MG/ML
1 INJECTION INTRAMUSCULAR EVERY 4 HOURS PRN
Status: DISCONTINUED | OUTPATIENT
Start: 2022-04-05 | End: 2022-04-08 | Stop reason: HOSPADM

## 2022-04-05 RX ORDER — TRAMADOL HYDROCHLORIDE 50 MG/1
50 TABLET ORAL EVERY 6 HOURS PRN
Status: DISCONTINUED | OUTPATIENT
Start: 2022-04-05 | End: 2022-04-08 | Stop reason: HOSPADM

## 2022-04-05 RX ORDER — SUCRALFATE ORAL 1 G/10ML
1 SUSPENSION ORAL EVERY 6 HOURS
Status: DISCONTINUED | OUTPATIENT
Start: 2022-04-05 | End: 2022-04-08 | Stop reason: HOSPADM

## 2022-04-05 RX ORDER — MORPHINE SULFATE 4 MG/ML
2 INJECTION INTRAVENOUS
Status: DISCONTINUED | OUTPATIENT
Start: 2022-04-05 | End: 2022-04-05

## 2022-04-05 RX ORDER — OXYCODONE HYDROCHLORIDE 5 MG/1
5 TABLET ORAL
Status: DISCONTINUED | OUTPATIENT
Start: 2022-04-05 | End: 2022-04-08 | Stop reason: HOSPADM

## 2022-04-05 RX ADMIN — ONDANSETRON 4 MG: 2 INJECTION INTRAMUSCULAR; INTRAVENOUS at 07:24

## 2022-04-05 RX ADMIN — SUCRALFATE 1 G: 1 SUSPENSION ORAL at 12:22

## 2022-04-05 RX ADMIN — GABAPENTIN 300 MG: 300 CAPSULE ORAL at 20:05

## 2022-04-05 RX ADMIN — LORAZEPAM 1 MG: 2 INJECTION INTRAMUSCULAR; INTRAVENOUS at 12:22

## 2022-04-05 RX ADMIN — ENOXAPARIN SODIUM 40 MG: 40 INJECTION SUBCUTANEOUS at 06:01

## 2022-04-05 RX ADMIN — SUCRALFATE 1 G: 1 SUSPENSION ORAL at 17:29

## 2022-04-05 RX ADMIN — SODIUM CHLORIDE: 9 INJECTION, SOLUTION INTRAVENOUS at 00:05

## 2022-04-05 RX ADMIN — OXYCODONE HYDROCHLORIDE 5 MG: 5 TABLET ORAL at 07:25

## 2022-04-05 RX ADMIN — LORAZEPAM 1 MG: 2 INJECTION INTRAMUSCULAR; INTRAVENOUS at 19:51

## 2022-04-05 RX ADMIN — METOCLOPRAMIDE 10 MG: 5 INJECTION, SOLUTION INTRAMUSCULAR; INTRAVENOUS at 04:49

## 2022-04-05 RX ADMIN — SCOPALAMINE 1 PATCH: 1 PATCH, EXTENDED RELEASE TRANSDERMAL at 04:50

## 2022-04-05 RX ADMIN — SODIUM CHLORIDE: 9 INJECTION, SOLUTION INTRAVENOUS at 22:36

## 2022-04-05 ASSESSMENT — LIFESTYLE VARIABLES
EVER FELT BAD OR GUILTY ABOUT YOUR DRINKING: NO
SUBSTANCE_ABUSE: 0
ALCOHOL_USE: NO
HAVE PEOPLE ANNOYED YOU BY CRITICIZING YOUR DRINKING: NO
AVERAGE NUMBER OF DAYS PER WEEK YOU HAVE A DRINK CONTAINING ALCOHOL: 0
DOES PATIENT WANT TO STOP DRINKING: NO
EVER HAD A DRINK FIRST THING IN THE MORNING TO STEADY YOUR NERVES TO GET RID OF A HANGOVER: NO
HAVE YOU EVER FELT YOU SHOULD CUT DOWN ON YOUR DRINKING: NO
TOTAL SCORE: 0
HOW MANY TIMES IN THE PAST YEAR HAVE YOU HAD 5 OR MORE DRINKS IN A DAY: 0
ON A TYPICAL DAY WHEN YOU DRINK ALCOHOL HOW MANY DRINKS DO YOU HAVE: 0
CONSUMPTION TOTAL: NEGATIVE

## 2022-04-05 ASSESSMENT — COGNITIVE AND FUNCTIONAL STATUS - GENERAL
MOBILITY SCORE: 16
SUGGESTED CMS G CODE MODIFIER DAILY ACTIVITY: CI
SUGGESTED CMS G CODE MODIFIER MOBILITY: CK
WALKING IN HOSPITAL ROOM: A LOT
HELP NEEDED FOR BATHING: A LITTLE
STANDING UP FROM CHAIR USING ARMS: A LOT
CLIMB 3 TO 5 STEPS WITH RAILING: A LOT
MOVING FROM LYING ON BACK TO SITTING ON SIDE OF FLAT BED: A LITTLE
DAILY ACTIVITIY SCORE: 23
MOVING TO AND FROM BED TO CHAIR: A LITTLE

## 2022-04-05 ASSESSMENT — ENCOUNTER SYMPTOMS
VOMITING: 1
POLYDIPSIA: 0
BACK PAIN: 0
BRUISES/BLEEDS EASILY: 0
DIARRHEA: 0
HALLUCINATIONS: 0
CONSTIPATION: 0
TREMORS: 0
HEMOPTYSIS: 0
DOUBLE VISION: 0
COUGH: 0
FOCAL WEAKNESS: 0
NECK PAIN: 0
ORTHOPNEA: 0
SPEECH CHANGE: 0
ABDOMINAL PAIN: 1
CHILLS: 0
NERVOUS/ANXIOUS: 0
HEARTBURN: 1
BLURRED VISION: 0
FLANK PAIN: 0
SPUTUM PRODUCTION: 0
PALPITATIONS: 0
FEVER: 0
HEADACHES: 0
PHOTOPHOBIA: 0
WEIGHT LOSS: 1
NAUSEA: 1

## 2022-04-05 ASSESSMENT — PATIENT HEALTH QUESTIONNAIRE - PHQ9
6. FEELING BAD ABOUT YOURSELF - OR THAT YOU ARE A FAILURE OR HAVE LET YOURSELF OR YOUR FAMILY DOWN: NOT AL ALL
8. MOVING OR SPEAKING SO SLOWLY THAT OTHER PEOPLE COULD HAVE NOTICED. OR THE OPPOSITE, BEING SO FIGETY OR RESTLESS THAT YOU HAVE BEEN MOVING AROUND A LOT MORE THAN USUAL: NOT AT ALL
SUM OF ALL RESPONSES TO PHQ9 QUESTIONS 1 AND 2: 1
4. FEELING TIRED OR HAVING LITTLE ENERGY: SEVERAL DAYS
SUM OF ALL RESPONSES TO PHQ QUESTIONS 1-9: 5
2. FEELING DOWN, DEPRESSED, IRRITABLE, OR HOPELESS: NOT AT ALL
5. POOR APPETITE OR OVEREATING: NEARLY EVERY DAY
9. THOUGHTS THAT YOU WOULD BE BETTER OFF DEAD, OR OF HURTING YOURSELF: NOT AT ALL
7. TROUBLE CONCENTRATING ON THINGS, SUCH AS READING THE NEWSPAPER OR WATCHING TELEVISION: NOT AT ALL
3. TROUBLE FALLING OR STAYING ASLEEP OR SLEEPING TOO MUCH: NOT AT ALL
1. LITTLE INTEREST OR PLEASURE IN DOING THINGS: SEVERAL DAYS

## 2022-04-05 ASSESSMENT — PAIN DESCRIPTION - PAIN TYPE
TYPE: ACUTE PAIN

## 2022-04-05 ASSESSMENT — FIBROSIS 4 INDEX: FIB4 SCORE: 0.61

## 2022-04-05 NOTE — HOSPITAL COURSE
Sudeep West is a 36 y.o. male with past medical history of hiatal hernia, GERD, status post Nissen fundoplication 3/16 by Dr. Carreon who presented 4/4/2022 with upper abdominal pain, nausea, vomiting that has been going on ever since that surgery, weight loss 14 pounds, generalized weakness.  He has only been able to tolerate liquids such or Powerade to water, while solid food causing the pain in the chest and in the upper stomach, sharp and dull and burning, without alleviating aggravating factors.  He would vomit immediately after eating or drinking or a few minutes later.  After surgery he was readmitted on 3/18 with the same symptoms, was evaluated with CT of the abdomen and pelvis showing postsurgical changes, and upper GI follow-through studies, showing some delayed gastric emptying.  ERP spoke to Dr Carreon, who did not recommend any additional surgery at this time.  He agreed to see the patient tomorrow morning after admission to the hospital.  Patient states that he was seen at [facility] emergency department last Wednesday, where she had CT of the abdomen pelvis repeated, reportedly showing postsurgical changes, as well as some small lesions in the liver and spleen, and some fluid in the pelvis.  I offered him to repeat CT of the abdomen pelvis tonight, but he would like to wait for consultation of Dr Carreon.  I discussed the plan of care with patient.

## 2022-04-05 NOTE — PROGRESS NOTES
CHW introduced community care management to the patient bedside. Patient declined CCM services reporting that he is comfortable scheduling his own follow up care and does not need resources for housing, transportation, or food. Patient declined a follow up call after DC from the hospital. CHW provided CCM brochure and asked the patient to call if needed.     Community Health Worker Intake  • Social determinates of health intake incomplete.   • Contact information provided to Sudeep West     Plan: CHW will remove the patient from CCM caseload.

## 2022-04-05 NOTE — PROGRESS NOTES
Patient was seen and examined.  Vital signs and labs reviewed.  Recent UGIS normal but he is not eating, is losing weight and reports dysphagia as well as other chronic abdominal symptoms. Counseled patient on options including diet, feeding tube, revision - releasing wrap, actively level and progress this far.  Questions answered.  Challenging case. KUB shows constipation. Will plan laparoscopy tomorrow to release fundoplication and be certain no anatomic limitations. He understands that reflux may return, and the chronic dysmotility will not be resolved.

## 2022-04-05 NOTE — ED PROVIDER NOTES
"ED Provider Note    CHIEF COMPLAINT  Chief Complaint   Patient presents with   • Abdominal Pain       HPI  Sudeep West is a 36 y.o. male who presents for evaluation of abdominal pain, nausea, vomiting.  Patient notes that he recently had a Nissen fundoplication for severely symptomatic hiatal hernia with acid reflux on 16 March.  He has had trouble since that date with nausea, vomiting, and abdominal pain.  He was readmitted to observation 2 days after the initial surgery for the same reasons.  He states he has lost 14 pounds since the surgery because he is unable to eat.  He states that when he tries to eat even small amounts it either \"get stuck in my esophagus and I vomited back up, or it stays in my stomach for a few minutes and then comes back up.\"  He notes there is no blood and he also notes that he does not have any diarrhea or constipation.  He notes that he has not had much stool output because he is not eating much.  He has been on clear liquids which is just a electrolyte solution since the surgery which for the most part seem to stay down but do cause pain when he drinks them.  He notes he still is making urine and has had no fevers or chills.    REVIEW OF SYSTEMS  Constitutional: No fevers or chills  Skin: No rashes  HEENT: No sore throat, runny nose, sores, or trouble speaking.  Neck: No neck pain  Chest: No pain or rashes  Pulm: No shortness of breath, cough, wheezing, stridor, or pain with inspiration/expiration  Gastrointestinal: No diarrhea, constipation, bloating, melena, hematochezia   Genitourinary: No dysuria or hematuria  Musculoskeletal: No  pain, swelling, weakness  Neurologic: No sensory or motor changes. No confusion or disorientation.  Heme: No bleeding or bruising problems.   Immuno: No hx of recurrent infections    PAST FAM HISTORY  Family History   Problem Relation Age of Onset   • Lung Cancer Mother    • Lung Disease Father    • Abdominal aortic aneurysm Father    • " Migraines Sister    • Seizures Sister        PAST MEDICAL HISTORY   has a past medical history of Anemia, Bowel habit changes, Heart burn, Hiatal hernia, Indigestion, and PONV (postoperative nausea and vomiting).    SOCIAL HISTORY  Social History     Tobacco Use   • Smoking status: Never Smoker   • Smokeless tobacco: Never Used   Vaping Use   • Vaping Use: Never used   Substance and Sexual Activity   • Alcohol use: Never   • Drug use: Not Currently   • Sexual activity: Not Currently       SURGICAL HISTORY   has a past surgical history that includes eye surgery; other abdominal surgery; other; other; and lap,esophagogast fundoplasty (3/16/2022).    CURRENT MEDICATIONS  Home Medications     Reviewed by Juan F Schaeffer (Pharmacy Tech) on 04/04/22 at 2111  Med List Status: Complete   Medication Last Dose Status   acetaminophen (TYLENOL) 160 MG/5ML Suspension 4/1/2022 Active   gabapentin (NEURONTIN) 300 MG Cap 4/3/2022 Active   hyoscyamine (LEVSIN) 0.125 MG tablet >14 days Active   omeprazole (PRILOSEC) 20 MG delayed-release capsule >14 days Active   ondansetron (ZOFRAN ODT) 4 MG TABLET DISPERSIBLE 4/4/2022 Active   Scopolamine Base (SCOPOLAMINE TD) 4/4/2022 Active   vitamin D3 (CHOLECALCIFEROL) 1000 Unit (25 mcg) Tab >14 days Active                ALLERGIES  Allergies   Allergen Reactions   • Sulfa Drugs Anaphylaxis   • Nsaids Nausea       PHYSICAL EXAM  VITAL SIGNS: /72   Pulse 81   Temp 36.7 °C (98 °F) (Temporal)   Resp 18   Wt 54 kg (119 lb 0.8 oz)   SpO2 92%   BMI 14.88 kg/m²    Gen: Appears tired, disheveled, pale, underweight  HEENT: No signs of trauma, Bilateral external ears normal, Nose normal. Conjunctiva normal, Non-icteric.   Cardiovascular: Regular rate and rhythm, no murmurs.  Capillary refill less than 3 seconds to all extremities, 2+ distal pulses.  Thorax & Lungs: Normal breath sounds, No respiratory distress, No wheezing bilateral chest rise  Abdomen: Bowel sounds normal, Soft,  diffuse moderate epigastric tenderness, No masses, No pulsatile masses. No Guarding or rebound  Skin: Warm, Dry, No erythema, No rash noted to exposed areas.   Extremities: Intact distal pulses, No edema  Neurologic: Alert , no facial droop, grossly normal coordination and strength  Psychiatric: Affect pleasant        LABS  Results for orders placed or performed during the hospital encounter of 04/04/22   CBC WITH DIFFERENTIAL   Result Value Ref Range    WBC 9.3 4.8 - 10.8 K/uL    RBC 4.66 (L) 4.70 - 6.10 M/uL    Hemoglobin 13.8 (L) 14.0 - 18.0 g/dL    Hematocrit 42.1 42.0 - 52.0 %    MCV 90.3 81.4 - 97.8 fL    MCH 29.6 27.0 - 33.0 pg    MCHC 32.8 (L) 33.7 - 35.3 g/dL    RDW 39.6 35.9 - 50.0 fL    Platelet Count 437 164 - 446 K/uL    MPV 8.9 (L) 9.0 - 12.9 fL    Neutrophils-Polys 53.60 44.00 - 72.00 %    Lymphocytes 35.80 22.00 - 41.00 %    Monocytes 8.10 0.00 - 13.40 %    Eosinophils 1.40 0.00 - 6.90 %    Basophils 1.00 0.00 - 1.80 %    Immature Granulocytes 0.10 0.00 - 0.90 %    Nucleated RBC 0.00 /100 WBC    Neutrophils (Absolute) 5.00 1.82 - 7.42 K/uL    Lymphs (Absolute) 3.33 1.00 - 4.80 K/uL    Monos (Absolute) 0.75 0.00 - 0.85 K/uL    Eos (Absolute) 0.13 0.00 - 0.51 K/uL    Baso (Absolute) 0.09 0.00 - 0.12 K/uL    Immature Granulocytes (abs) 0.01 0.00 - 0.11 K/uL    NRBC (Absolute) 0.00 K/uL   COMP METABOLIC PANEL   Result Value Ref Range    Sodium 141 135 - 145 mmol/L    Potassium 4.1 3.6 - 5.5 mmol/L    Chloride 104 96 - 112 mmol/L    Co2 22 20 - 33 mmol/L    Anion Gap 15.0 7.0 - 16.0    Glucose 100 (H) 65 - 99 mg/dL    Bun 13 8 - 22 mg/dL    Creatinine 0.81 0.50 - 1.40 mg/dL    Calcium 9.8 8.5 - 10.5 mg/dL    AST(SGOT) 21 12 - 45 U/L    ALT(SGPT) 14 2 - 50 U/L    Alkaline Phosphatase 57 30 - 99 U/L    Total Bilirubin 0.5 0.1 - 1.5 mg/dL    Albumin 4.9 3.2 - 4.9 g/dL    Total Protein 8.2 6.0 - 8.2 g/dL    Globulin 3.3 1.9 - 3.5 g/dL    A-G Ratio 1.5 g/dL   LIPASE   Result Value Ref Range    Lipase 58 11 -  82 U/L   URINALYSIS    Specimen: Urine   Result Value Ref Range    Color Yellow     Character Clear     Specific Gravity 1.023 <1.035    Ph 7.0 5.0 - 8.0    Glucose Negative Negative mg/dL    Ketones Trace (A) Negative mg/dL    Protein Negative Negative mg/dL    Bilirubin Negative Negative    Urobilinogen, Urine 1.0 Negative    Nitrite Negative Negative    Leukocyte Esterase Negative Negative    Occult Blood Negative Negative    Micro Urine Req see below    ESTIMATED GFR   Result Value Ref Range    GFR (CKD-EPI) 117 >60 mL/min/1.73 m 2   LACTIC ACID   Result Value Ref Range    Lactic Acid 1.4 0.5 - 2.0 mmol/L   PHOSPHORUS   Result Value Ref Range    Phosphorus 2.9 2.5 - 4.5 mg/dL   CBC with Differential   Result Value Ref Range    WBC 5.9 4.8 - 10.8 K/uL    RBC 3.86 (L) 4.70 - 6.10 M/uL    Hemoglobin 11.4 (L) 14.0 - 18.0 g/dL    Hematocrit 35.2 (L) 42.0 - 52.0 %    MCV 91.2 81.4 - 97.8 fL    MCH 29.5 27.0 - 33.0 pg    MCHC 32.4 (L) 33.7 - 35.3 g/dL    RDW 39.8 35.9 - 50.0 fL    Platelet Count 321 164 - 446 K/uL    MPV 8.9 (L) 9.0 - 12.9 fL    Neutrophils-Polys 65.30 44.00 - 72.00 %    Lymphocytes 23.90 22.00 - 41.00 %    Monocytes 8.80 0.00 - 13.40 %    Eosinophils 0.50 0.00 - 6.90 %    Basophils 1.00 0.00 - 1.80 %    Immature Granulocytes 0.50 0.00 - 0.90 %    Nucleated RBC 0.00 /100 WBC    Neutrophils (Absolute) 3.84 1.82 - 7.42 K/uL    Lymphs (Absolute) 1.41 1.00 - 4.80 K/uL    Monos (Absolute) 0.52 0.00 - 0.85 K/uL    Eos (Absolute) 0.03 0.00 - 0.51 K/uL    Baso (Absolute) 0.06 0.00 - 0.12 K/uL    Immature Granulocytes (abs) 0.03 0.00 - 0.11 K/uL    NRBC (Absolute) 0.00 K/uL         HYDRATION: Based on the patient's presentation of Abnormal Fluid Loss the patient was given IV fluids. IV Hydration was used because oral hydration was not adequate alone. Upon recheck following hydration, the patient was stable.    COURSE & MEDICAL DECISION MAKING  Patient arrives for evaluation of inability to eat and weight loss  in the setting of surgery 2 weeks ago and continued pain with nausea.  Patient notes he has been keeping clear fluids down which is consistent with his labs and vital signs which do not appear to indicate significant dehydration however he does appear underweight.  He does not appear septic or toxic but I am unclear if imaging will benefit him at this point.  I will attempt symptom control with narcotic pain meds, nausea meds, and empiric fluids.  I will attempt to contact Dr. Carreon's office to consult as it is likely the patient needs readmission for symptom control and consideration of some form of imaging to help diagnose his problem.  He appears to be tolerating his own secretions and and is phonating normally.    Case was discussed with the patient surgeon who stated understanding the findings and agreed with the plan for admission to the hospital service for symptomatic treatment until he can be seen tomorrow in consultation.  Patient has not  had any deterioration in his condition and was discussed with the hospitalist who will evaluate the patient in the emergency department.    FINAL IMPRESSION  1.  Intractable nausea and vomiting  2.  Abdominal pain    Electronically signed by: Wilbert Sabillon M.D., 4/4/2022 6:46 PM

## 2022-04-05 NOTE — ED TRIAGE NOTES
Hital hernia repair on 3/18, patient has been unable to eat or drink really since.  Patient has had multiple follow ups with little relief, states the food gets caught and comes right back out.  NO noted fever.  Patient feels weak.

## 2022-04-05 NOTE — DIETARY
Nutrition services: Day 0 of admit.  Sudeep West is a 36 y.o. male with admitting DX of abdominal pain since Nissen fundoplication on 3/16  History includes: hiatal hernia, GERD, status post Nissen fundoplication  · Patient with weight loss and low BMI noted on admit screen.  · He stated he has been tested for food allergies and avoids lactose.  He was sleepy at time of visit, but did state he would like help navigating eating recommendations post surgery.  He stated since the fundoplication he is not even always able to keep liquids down.    Assessment:  Weight: 54 kg (119 lb 0.8 oz)  Body mass index is 14.88 kg/m².   Diet/Intake: clears with boost supplements    Evaluation:   1. Per chart review, Patient has los 11-14 # in the past 2-3 weeks  2. Glucose 136 today  3. Per surgery note, UGI normal; KUB shows constipation.  Laparoscopy tomorrow to release fundoplication    Malnutrition Risk: Severe acute malnutrition as evidenced by 11% weight loss in less than one month and intakes of 50% of estimated need for >5 days.  Severe protein and fat wasting as evidenced by bony prominences and depressed temporal region.    Recommendations/Inteventions/Plan:  1. Clear liquid diet today; NPO after midnight for surgery  2. Restart PO diet as appropriate after surgery     RD following and will see patient after surgery.

## 2022-04-05 NOTE — ED NOTES
Patient is resting comfortably. Denies any needs  Given call light and urinal  Pt confirms significant decrease in pain since morphine

## 2022-04-05 NOTE — ASSESSMENT & PLAN NOTE
Patient states that he lost 14 pounds in 2 weeks, BMI 14.87  Dietitian consult  Supplements,  Thiamine supplementation

## 2022-04-05 NOTE — ASSESSMENT & PLAN NOTE
Status post severe symptomatic hiatal hernia repair 3/16 by   Has been experiencing upper abdominal and chest pain worsening after food intake, nausea, vomiting, inability to hold down food or medications, weight loss 14 pounds  Has been admitted with the same symptoms on 3/18, was evaluated with CT of the abdomen pelvis with contrast and upper GI follow-through study, showing delayed gastric emptying and postsurgical changes  Plan:  Abdominal x-ray to rule out small bowel obstruction  IV rehydration  IV Protonix 40 mg once a day  Trial of IV metoclopramide  Sucralfate  Zofran as needed  Clear liquid diet as tolerated  General surgery/Dr Carreon will consult tomorrow  4/5/2022:  Plan surgery on 4/6/2022 n.p.o. at midnight holding chemical anticoagulation at this point.  Continue with sequential pressure devices.  Reevaluate after surgery.

## 2022-04-05 NOTE — PROGRESS NOTES
[facility] Medicine Daily Progress Note    Date of Service  4/5/2022    Chief Complaint  Sudeep West is a 36 y.o. male admitted 4/4/2022 with abdominal pain    Hospital Course  Sudeep West is a 36 y.o. male with past medical history of hiatal hernia, GERD, status post Nissen fundoplication 3/16 by Dr. Carreon who presented 4/4/2022 with upper abdominal pain, nausea, vomiting that has been going on ever since that surgery, weight loss 14 pounds, generalized weakness.  He has only been able to tolerate liquids such or Powerade to water, while solid food causing the pain in the chest and in the upper stomach, sharp and dull and burning, without alleviating aggravating factors.  He would vomit immediately after eating or drinking or a few minutes later.  After surgery he was readmitted on 3/18 with the same symptoms, was evaluated with CT of the abdomen and pelvis showing postsurgical changes, and upper GI follow-through studies, showing some delayed gastric emptying.  ERP spoke to Dr Carreon, who did not recommend any additional surgery at this time.  He agreed to see the patient tomorrow morning after admission to the hospital.  Patient states that he was seen at [facility] emergency department last Wednesday, where she had CT of the abdomen pelvis repeated, reportedly showing postsurgical changes, as well as some small lesions in the liver and spleen, and some fluid in the pelvis.  I offered him to repeat CT of the abdomen pelvis tonight, but he would like to wait for consultation of Dr Carreon.  I discussed the plan of care with patient.      Interval Problem Update  4/5/2022:  Appreciate surgery recommendations.  Patient to be kept n.p.o. at midnight plan surgery for 4/6/2022.  Patient provided with anxiolytics as needed.  Patient offers no acute complaints difficulty swallowing mild abdominal pain.  Awaiting surgery.    I have personally seen and examined the patient at bedside. I discussed the plan  of care with patient, bedside RN and charge RN.    Consultants/Specialty  general surgery    Code Status  Full Code    Disposition  Patient is not medically cleared for discharge.   Anticipate discharge to to home with close outpatient follow-up.  I have placed the appropriate orders for post-discharge needs.    Review of Systems  Review of Systems   Constitutional: Positive for malaise/fatigue and weight loss. Negative for chills and fever.   HENT: Negative for ear pain, hearing loss and tinnitus.    Eyes: Negative for blurred vision, double vision and photophobia.   Respiratory: Negative for cough, hemoptysis and sputum production.    Cardiovascular: Negative for chest pain, palpitations and orthopnea.   Gastrointestinal: Positive for abdominal pain, heartburn, nausea and vomiting. Negative for constipation and diarrhea.   Genitourinary: Negative for dysuria, flank pain, frequency and hematuria.   Musculoskeletal: Positive for joint pain. Negative for back pain and neck pain.   Skin: Negative for itching and rash.   Neurological: Negative for tremors, speech change, focal weakness and headaches.   Endo/Heme/Allergies: Negative for environmental allergies and polydipsia. Does not bruise/bleed easily.   Psychiatric/Behavioral: Negative for hallucinations and substance abuse. The patient is not nervous/anxious.         Physical Exam  Temp:  [36.4 °C (97.6 °F)-36.9 °C (98.4 °F)] 36.9 °C (98.4 °F)  Pulse:  [78-98] 96  Resp:  [15-22] 18  BP: (106-119)/(65-80) 110/76  SpO2:  [92 %-99 %] 96 %    Physical Exam  Vitals and nursing note reviewed.   Constitutional:       General: He is not in acute distress.     Appearance: Normal appearance.      Comments: Thin appearing   HENT:      Head: Normocephalic and atraumatic.      Nose: Nose normal.      Mouth/Throat:      Mouth: Mucous membranes are dry.   Eyes:      Extraocular Movements: Extraocular movements intact.      Pupils: Pupils are equal, round, and reactive to light.    Cardiovascular:      Rate and Rhythm: Normal rate and regular rhythm.   Pulmonary:      Effort: Pulmonary effort is normal.      Breath sounds: Normal breath sounds.   Abdominal:      General: Abdomen is flat. There is no distension.      Tenderness: There is abdominal tenderness in the epigastric area. There is no guarding or rebound.   Musculoskeletal:         General: No swelling or deformity. Normal range of motion.      Cervical back: Normal range of motion and neck supple.   Skin:     General: Skin is warm and dry.   Neurological:      General: No focal deficit present.      Mental Status: He is alert and oriented to person, place, and time.   Psychiatric:         Mood and Affect: Mood is depressed.         Behavior: Behavior normal.         Fluids    Intake/Output Summary (Last 24 hours) at 4/5/2022 1433  Last data filed at 4/5/2022 0100  Gross per 24 hour   Intake --   Output 50 ml   Net -50 ml       Laboratory  Recent Labs     04/04/22  1740 04/05/22  0146   WBC 9.3 5.9   RBC 4.66* 3.86*   HEMOGLOBIN 13.8* 11.4*   HEMATOCRIT 42.1 35.2*   MCV 90.3 91.2   MCH 29.6 29.5   MCHC 32.8* 32.4*   RDW 39.6 39.8   PLATELETCT 437 321   MPV 8.9* 8.9*     Recent Labs     04/04/22  1740 04/05/22  0146   SODIUM 141 140   POTASSIUM 4.1 3.8   CHLORIDE 104 106   CO2 22 23   GLUCOSE 100* 136*   BUN 13 11   CREATININE 0.81 0.74   CALCIUM 9.8 9.0                   Imaging  [id number] VIEW   Final Result         1.  Moderate stool in the colon suggests changes of constipation, otherwise nonspecific bowel gas pattern           Assessment/Plan  * Abdominal pain- (present on admission)  Assessment & Plan  Status post severe symptomatic hiatal hernia repair 3/16 by   Has been experiencing upper abdominal and chest pain worsening after food intake, nausea, vomiting, inability to hold down food or medications, weight loss 14 pounds  Has been admitted with the same symptoms on 3/18, was evaluated with CT of the abdomen  pelvis with contrast and upper GI follow-through study, showing delayed gastric emptying and postsurgical changes  Plan:  Abdominal x-ray to rule out small bowel obstruction  IV rehydration  IV Protonix 40 mg once a day  Trial of IV metoclopramide  Sucralfate  Zofran as needed  Clear liquid diet as tolerated  General surgery/Dr Carreon will consult tomorrow      Severe protein-calorie malnutrition (HCC)  Assessment & Plan  Patient states that he lost 14 pounds in 2 weeks, BMI 14.87  Dietitian consult  Supplements,  Thiamine supplementation       VTE prophylaxis: SCDs/TEDs and pharmacologic prophylaxis contraindicated due to Impending surgery    I have performed a physical exam and reviewed and updated ROS and Plan today (4/5/2022). In review of yesterday's note (4/4/2022), there are no changes except as documented above.

## 2022-04-05 NOTE — CARE PLAN
The patient is Stable - Low risk of patient condition declining or worsening    Shift Goals  Clinical Goals: Patient's anxiety level will decrease w/ talk therapy and/or medications  Patient Goals: Rest, anxiety control  Family Goals: NA    Progress made toward(s) clinical / shift goals:    Problem: Psychosocial  Goal: Patient's level of anxiety will decrease  Outcome: Progressing   Hospitalist ordered IV ativan for patient's anxiety which has helped patient so much with much needed rest and relaxation from his worries. Educated about habit forming side effects, patient aware, no history of substance abuse.    Problem: Risk for Aspiration  Goal: Patient's risk for aspiration will be absent or decrease  Outcome: Progressing   Speech consult ordered for difficult swallowing, which we are aware is d/t issues w/ past hiatal hernia repair, but consult ordered just so patient can be safe on some nutritious diet as he's lost a lot of weight since surgery.

## 2022-04-05 NOTE — ED NOTES
Pt resting comfortably, denies any needs, still states pain is manageable  Advised pt to hit his call light if pain worsens, VSS

## 2022-04-05 NOTE — H&P
Hospital Medicine History & Physical Note    Date of Service  4/4/2022    Primary Care Physician  Nelda Lovett, [facility].    Consultants  Dr. Carreon, surgery      Code Status  Full Code    Chief Complaint  Chief Complaint   Patient presents with   • Abdominal Pain       History of Presenting Illness  Sudeep West is a 36 y.o. male with past medical history of hiatal hernia, GERD, status post Nissen fundoplication 3/16 by Dr. Carreon who presented 4/4/2022 with upper abdominal pain, nausea, vomiting that has been going on ever since that surgery, weight loss 14 pounds, generalized weakness.  He has only been able to tolerate liquids such or Powerade to water, while solid food causing the pain in the chest and in the upper stomach, sharp and dull and burning, without alleviating aggravating factors.  He would vomit immediately after eating or drinking or a few minutes later.  After surgery he was readmitted on 3/18 with the same symptoms, was evaluated with CT of the abdomen and pelvis showing postsurgical changes, and upper GI follow-through studies, showing some delayed gastric emptying.  ERP spoke to Dr Carreon, who did not recommend any additional surgery at this time.  He agreed to see the patient tomorrow morning after admission to the hospital.  Patient states that he was seen at [facility] emergency department last Wednesday, where she had CT of the abdomen pelvis repeated, reportedly showing postsurgical changes, as well as some small lesions in the liver and spleen, and some fluid in the pelvis.  I offered him to repeat CT of the abdomen pelvis tonight, but he would like to wait for consultation of Dr Carreon.  I discussed the plan of care with patient.    Review of Systems  Review of Systems   Constitutional: Positive for malaise/fatigue and weight loss. Negative for chills and fever.   HENT: Negative for ear pain, hearing loss and tinnitus.    Eyes: Negative for blurred vision, double vision  and photophobia.   Respiratory: Negative for cough, hemoptysis and sputum production.    Cardiovascular: Negative for chest pain, palpitations and orthopnea.   Gastrointestinal: Positive for abdominal pain, heartburn, nausea and vomiting. Negative for constipation and diarrhea.   Genitourinary: Negative for dysuria, flank pain, frequency and hematuria.   Musculoskeletal: Positive for joint pain. Negative for back pain and neck pain.   Skin: Negative for itching and rash.   Neurological: Negative for tremors, speech change, focal weakness and headaches.   Endo/Heme/Allergies: Negative for environmental allergies and polydipsia. Does not bruise/bleed easily.   Psychiatric/Behavioral: Negative for hallucinations and substance abuse. The patient is not nervous/anxious.        Past Medical History   has a past medical history of Anemia, Bowel habit changes, Heart burn, Hiatal hernia, Indigestion, and PONV (postoperative nausea and vomiting).    Surgical History   has a past surgical history that includes eye surgery; other abdominal surgery; other; other; and pr lap,esophagogast fundoplasty (3/16/2022).     Family History  family history includes Abdominal aortic aneurysm in his father; Lung Cancer in his mother; Lung Disease in his father; Migraines in his sister; Seizures in his sister.   Family history reviewed with patient. There is no family history that is pertinent to the chief complaint.     Social History   reports that he has never smoked. He has never used smokeless tobacco. He reports previous drug use. He reports that he does not drink alcohol.    Allergies  Allergies   Allergen Reactions   • Sulfa Drugs Anaphylaxis   • Nsaids Nausea       Medications  Prior to Admission Medications   Prescriptions Last Dose Informant Patient Reported? Taking?   acetaminophen (TYLENOL) 500 MG Tab  Patient Yes No   Sig: Take 500 mg by mouth every 6 hours as needed for Mild Pain.   gabapentin (NEURONTIN) 300 MG Cap  Patient  Yes No   Sig: Take 300 mg by mouth at bedtime.   hyoscyamine (LEVSIN) 0.125 MG tablet  Patient Yes No   Sig: Take 125 mcg by mouth every four hours as needed for Cramping.   omeprazole (PRILOSEC) 20 MG delayed-release capsule  Patient Yes No   Sig: Take 20 mg by mouth every day.   ondansetron (ZOFRAN ODT) 4 MG TABLET DISPERSIBLE  Patient No No   Sig: Take 1 Tablet by mouth every 6 hours as needed for Nausea.   vitamin D3 (CHOLECALCIFEROL) 1000 Unit (25 mcg) Tab  Patient Yes No   Sig: Take 1,000 Units by mouth every morning.      Facility-Administered Medications: None       Physical Exam  Temp:  [36.4 °C (97.6 °F)] 36.4 °C (97.6 °F)  Pulse:  [78-87] 79  Resp:  [18-22] 22  BP: (106-119)/(69-80) 106/71  SpO2:  [96 %-99 %] 96 %  Blood Pressure: 106/71   Temperature: 36.4 °C (97.6 °F)   Pulse: 79   Respiration: (!) 22   Pulse Oximetry: 96 %       Physical Exam  Vitals and nursing note reviewed.   Constitutional:       General: He is not in acute distress.     Appearance: Normal appearance.      Comments: Thin appearing   HENT:      Head: Normocephalic and atraumatic.      Nose: Nose normal.      Mouth/Throat:      Mouth: Mucous membranes are dry.   Eyes:      Extraocular Movements: Extraocular movements intact.      Pupils: Pupils are equal, round, and reactive to light.   Cardiovascular:      Rate and Rhythm: Normal rate and regular rhythm.   Pulmonary:      Effort: Pulmonary effort is normal.      Breath sounds: Normal breath sounds.   Abdominal:      General: Abdomen is flat. There is no distension.      Tenderness: There is abdominal tenderness in the epigastric area. There is no guarding or rebound.   Musculoskeletal:         General: No swelling or deformity. Normal range of motion.      Cervical back: Normal range of motion and neck supple.   Skin:     General: Skin is warm and dry.   Neurological:      General: No focal deficit present.      Mental Status: He is alert and oriented to person, place, and time.    Psychiatric:         Mood and Affect: Mood is depressed.         Behavior: Behavior normal.         Laboratory:  Recent Labs     04/04/22  1740   WBC 9.3   RBC 4.66*   HEMOGLOBIN 13.8*   HEMATOCRIT 42.1   MCV 90.3   MCH 29.6   MCHC 32.8*   RDW 39.6   PLATELETCT 437   MPV 8.9*     Recent Labs     04/04/22  1740   SODIUM 141   POTASSIUM 4.1   CHLORIDE 104   CO2 22   GLUCOSE 100*   BUN 13   CREATININE 0.81   CALCIUM 9.8     Recent Labs     04/04/22  1740   ALTSGPT 14   ASTSGOT 21   ALKPHOSPHAT 57   TBILIRUBIN 0.5   LIPASE 58   GLUCOSE 100*         No results for input(s): NTPROBNP in the last 72 hours.      No results for input(s): TROPONINT in the last 72 hours.    Imaging:  [id number] [id number]    (Results Pending)       X-Ray:  I have personally reviewed the images and compared with prior images.    Assessment/Plan:  I anticipate this patient is appropriate for observation status at this time.    * Abdominal pain- (present on admission)  Assessment & Plan  Status post severe symptomatic hiatal hernia repair 3/16 by   Has been experiencing upper abdominal and chest pain worsening after food intake, nausea, vomiting, inability to hold down food or medications, weight loss 14 pounds  Has been admitted with the same symptoms on 3/18, was evaluated with CT of the abdomen pelvis with contrast and upper GI follow-through study, showing delayed gastric emptying and postsurgical changes  Plan:  Abdominal x-ray to rule out small bowel obstruction  IV rehydration  IV Protonix 40 mg once a day  Trial of IV metoclopramide  Sucralfate  Zofran as needed  Clear liquid diet as tolerated  General surgery/Dr Carreon will consult tomorrow      Severe protein-calorie malnutrition (HCC)  Assessment & Plan  Patient states that he lost 14 pounds in 2 weeks, BMI 14.87  Dietitian consult  Supplements,  Thiamine supplementation      VTE prophylaxis: enoxaparin ppx

## 2022-04-05 NOTE — ED NOTES
Pt medicated per MAR for pain and nausea  VSS  Urine collected and sent to lab  Pt denies any other needs

## 2022-04-05 NOTE — CARE PLAN
The patient is Stable - Low risk of patient condition declining or worsening    Shift Goals  Clinical Goals: Pain control, maintain pt safety  Patient Goals: Pain control and ability to swallow  Family Goals: NA    Progress made toward(s) clinical / shift goals:  Pt is A+Ox4, is having pain and difficulty swallowing liquids and solids.     Patient is not progressing towards the following goals:

## 2022-04-06 ENCOUNTER — ANESTHESIA (OUTPATIENT)
Dept: SURGERY | Facility: MEDICAL CENTER | Age: 37
DRG: 326 | End: 2022-04-06
Payer: MEDICAID

## 2022-04-06 ENCOUNTER — ANESTHESIA EVENT (OUTPATIENT)
Dept: SURGERY | Facility: MEDICAL CENTER | Age: 37
DRG: 326 | End: 2022-04-06
Payer: MEDICAID

## 2022-04-06 PROCEDURE — 96376 TX/PRO/DX INJ SAME DRUG ADON: CPT

## 2022-04-06 PROCEDURE — 160035 HCHG PACU - 1ST 60 MINS PHASE I: Performed by: COLON & RECTAL SURGERY

## 2022-04-06 PROCEDURE — 700105 HCHG RX REV CODE 258: Performed by: INTERNAL MEDICINE

## 2022-04-06 PROCEDURE — 700105 HCHG RX REV CODE 258: Performed by: ANESTHESIOLOGY

## 2022-04-06 PROCEDURE — 700102 HCHG RX REV CODE 250 W/ 637 OVERRIDE(OP): Performed by: INTERNAL MEDICINE

## 2022-04-06 PROCEDURE — 160039 HCHG SURGERY MINUTES - EA ADDL 1 MIN LEVEL 3: Performed by: COLON & RECTAL SURGERY

## 2022-04-06 PROCEDURE — 700111 HCHG RX REV CODE 636 W/ 250 OVERRIDE (IP): Performed by: ANESTHESIOLOGY

## 2022-04-06 PROCEDURE — 700102 HCHG RX REV CODE 250 W/ 637 OVERRIDE(OP): Performed by: STUDENT IN AN ORGANIZED HEALTH CARE EDUCATION/TRAINING PROGRAM

## 2022-04-06 PROCEDURE — 160048 HCHG OR STATISTICAL LEVEL 1-5: Performed by: COLON & RECTAL SURGERY

## 2022-04-06 PROCEDURE — 501571 HCHG TROCAR, SEPARATOR 12X100: Performed by: COLON & RECTAL SURGERY

## 2022-04-06 PROCEDURE — 501838 HCHG SUTURE GENERAL: Performed by: COLON & RECTAL SURGERY

## 2022-04-06 PROCEDURE — 700111 HCHG RX REV CODE 636 W/ 250 OVERRIDE (IP): Performed by: INTERNAL MEDICINE

## 2022-04-06 PROCEDURE — A9270 NON-COVERED ITEM OR SERVICE: HCPCS | Performed by: STUDENT IN AN ORGANIZED HEALTH CARE EDUCATION/TRAINING PROGRAM

## 2022-04-06 PROCEDURE — 700102 HCHG RX REV CODE 250 W/ 637 OVERRIDE(OP): Performed by: ANESTHESIOLOGY

## 2022-04-06 PROCEDURE — 99232 SBSQ HOSP IP/OBS MODERATE 35: CPT | Performed by: STUDENT IN AN ORGANIZED HEALTH CARE EDUCATION/TRAINING PROGRAM

## 2022-04-06 PROCEDURE — C9113 INJ PANTOPRAZOLE SODIUM, VIA: HCPCS | Performed by: INTERNAL MEDICINE

## 2022-04-06 PROCEDURE — 96375 TX/PRO/DX INJ NEW DRUG ADDON: CPT

## 2022-04-06 PROCEDURE — 700105 HCHG RX REV CODE 258: Performed by: STUDENT IN AN ORGANIZED HEALTH CARE EDUCATION/TRAINING PROGRAM

## 2022-04-06 PROCEDURE — 160009 HCHG ANES TIME/MIN: Performed by: COLON & RECTAL SURGERY

## 2022-04-06 PROCEDURE — 160028 HCHG SURGERY MINUTES - 1ST 30 MINS LEVEL 3: Performed by: COLON & RECTAL SURGERY

## 2022-04-06 PROCEDURE — 770001 HCHG ROOM/CARE - MED/SURG/GYN PRIV*

## 2022-04-06 PROCEDURE — 501497 HCHG SURGICLIP: Performed by: COLON & RECTAL SURGERY

## 2022-04-06 PROCEDURE — 160002 HCHG RECOVERY MINUTES (STAT): Performed by: COLON & RECTAL SURGERY

## 2022-04-06 PROCEDURE — 00790 ANES IPER UPR ABD NOS: CPT | Performed by: ANESTHESIOLOGY

## 2022-04-06 PROCEDURE — 0DQ44ZZ REPAIR ESOPHAGOGASTRIC JUNCTION, PERCUTANEOUS ENDOSCOPIC APPROACH: ICD-10-PCS | Performed by: COLON & RECTAL SURGERY

## 2022-04-06 PROCEDURE — A9270 NON-COVERED ITEM OR SERVICE: HCPCS | Performed by: ANESTHESIOLOGY

## 2022-04-06 PROCEDURE — 700101 HCHG RX REV CODE 250: Performed by: ANESTHESIOLOGY

## 2022-04-06 PROCEDURE — 700111 HCHG RX REV CODE 636 W/ 250 OVERRIDE (IP): Performed by: STUDENT IN AN ORGANIZED HEALTH CARE EDUCATION/TRAINING PROGRAM

## 2022-04-06 PROCEDURE — A9270 NON-COVERED ITEM OR SERVICE: HCPCS | Performed by: INTERNAL MEDICINE

## 2022-04-06 PROCEDURE — 700101 HCHG RX REV CODE 250: Performed by: COLON & RECTAL SURGERY

## 2022-04-06 PROCEDURE — 502570 HCHG PACK, GASTRIC BANDING: Performed by: COLON & RECTAL SURGERY

## 2022-04-06 PROCEDURE — 501570 HCHG TROCAR, SEPARATOR: Performed by: COLON & RECTAL SURGERY

## 2022-04-06 RX ORDER — DIPHENHYDRAMINE HYDROCHLORIDE 50 MG/ML
6.25 INJECTION INTRAMUSCULAR; INTRAVENOUS
Status: DISCONTINUED | OUTPATIENT
Start: 2022-04-06 | End: 2022-04-06 | Stop reason: HOSPADM

## 2022-04-06 RX ORDER — MEPERIDINE HYDROCHLORIDE 25 MG/ML
12.5 INJECTION INTRAMUSCULAR; INTRAVENOUS; SUBCUTANEOUS
Status: DISCONTINUED | OUTPATIENT
Start: 2022-04-06 | End: 2022-04-06 | Stop reason: HOSPADM

## 2022-04-06 RX ORDER — DEXAMETHASONE SODIUM PHOSPHATE 4 MG/ML
INJECTION, SOLUTION INTRA-ARTICULAR; INTRALESIONAL; INTRAMUSCULAR; INTRAVENOUS; SOFT TISSUE PRN
Status: DISCONTINUED | OUTPATIENT
Start: 2022-04-06 | End: 2022-04-06 | Stop reason: SURG

## 2022-04-06 RX ORDER — HALOPERIDOL 5 MG/ML
1 INJECTION INTRAMUSCULAR
Status: DISCONTINUED | OUTPATIENT
Start: 2022-04-06 | End: 2022-04-06 | Stop reason: HOSPADM

## 2022-04-06 RX ORDER — OXYCODONE HCL 5 MG/5 ML
5 SOLUTION, ORAL ORAL
Status: COMPLETED | OUTPATIENT
Start: 2022-04-06 | End: 2022-04-06

## 2022-04-06 RX ORDER — ROCURONIUM BROMIDE 10 MG/ML
INJECTION, SOLUTION INTRAVENOUS PRN
Status: DISCONTINUED | OUTPATIENT
Start: 2022-04-06 | End: 2022-04-06 | Stop reason: SURG

## 2022-04-06 RX ORDER — LIDOCAINE HYDROCHLORIDE 20 MG/ML
INJECTION, SOLUTION EPIDURAL; INFILTRATION; INTRACAUDAL; PERINEURAL PRN
Status: DISCONTINUED | OUTPATIENT
Start: 2022-04-06 | End: 2022-04-06 | Stop reason: SURG

## 2022-04-06 RX ORDER — BUPIVACAINE HYDROCHLORIDE AND EPINEPHRINE 5; 5 MG/ML; UG/ML
INJECTION, SOLUTION EPIDURAL; INTRACAUDAL; PERINEURAL
Status: DISCONTINUED | OUTPATIENT
Start: 2022-04-06 | End: 2022-04-06 | Stop reason: HOSPADM

## 2022-04-06 RX ORDER — OXYCODONE HCL 5 MG/5 ML
10 SOLUTION, ORAL ORAL
Status: COMPLETED | OUTPATIENT
Start: 2022-04-06 | End: 2022-04-06

## 2022-04-06 RX ORDER — DEXTROSE AND SODIUM CHLORIDE 5; .45 G/100ML; G/100ML
INJECTION, SOLUTION INTRAVENOUS CONTINUOUS
Status: ACTIVE | OUTPATIENT
Start: 2022-04-06 | End: 2022-04-07

## 2022-04-06 RX ORDER — HYDROMORPHONE HYDROCHLORIDE 1 MG/ML
0.1 INJECTION, SOLUTION INTRAMUSCULAR; INTRAVENOUS; SUBCUTANEOUS
Status: DISCONTINUED | OUTPATIENT
Start: 2022-04-06 | End: 2022-04-06 | Stop reason: HOSPADM

## 2022-04-06 RX ORDER — HYDRALAZINE HYDROCHLORIDE 20 MG/ML
5 INJECTION INTRAMUSCULAR; INTRAVENOUS
Status: DISCONTINUED | OUTPATIENT
Start: 2022-04-06 | End: 2022-04-06 | Stop reason: HOSPADM

## 2022-04-06 RX ORDER — SODIUM CHLORIDE, SODIUM LACTATE, POTASSIUM CHLORIDE, CALCIUM CHLORIDE 600; 310; 30; 20 MG/100ML; MG/100ML; MG/100ML; MG/100ML
INJECTION, SOLUTION INTRAVENOUS CONTINUOUS
Status: DISCONTINUED | OUTPATIENT
Start: 2022-04-06 | End: 2022-04-06 | Stop reason: HOSPADM

## 2022-04-06 RX ORDER — HYDROMORPHONE HYDROCHLORIDE 1 MG/ML
0.4 INJECTION, SOLUTION INTRAMUSCULAR; INTRAVENOUS; SUBCUTANEOUS
Status: DISCONTINUED | OUTPATIENT
Start: 2022-04-06 | End: 2022-04-06 | Stop reason: HOSPADM

## 2022-04-06 RX ORDER — HYDROMORPHONE HYDROCHLORIDE 1 MG/ML
0.2 INJECTION, SOLUTION INTRAMUSCULAR; INTRAVENOUS; SUBCUTANEOUS
Status: DISCONTINUED | OUTPATIENT
Start: 2022-04-06 | End: 2022-04-06 | Stop reason: HOSPADM

## 2022-04-06 RX ORDER — SODIUM CHLORIDE, SODIUM LACTATE, POTASSIUM CHLORIDE, CALCIUM CHLORIDE 600; 310; 30; 20 MG/100ML; MG/100ML; MG/100ML; MG/100ML
INJECTION, SOLUTION INTRAVENOUS
Status: DISCONTINUED | OUTPATIENT
Start: 2022-04-06 | End: 2022-04-06 | Stop reason: SURG

## 2022-04-06 RX ORDER — ONDANSETRON 2 MG/ML
4 INJECTION INTRAMUSCULAR; INTRAVENOUS
Status: COMPLETED | OUTPATIENT
Start: 2022-04-06 | End: 2022-04-06

## 2022-04-06 RX ORDER — ONDANSETRON 2 MG/ML
INJECTION INTRAMUSCULAR; INTRAVENOUS PRN
Status: DISCONTINUED | OUTPATIENT
Start: 2022-04-06 | End: 2022-04-06 | Stop reason: SURG

## 2022-04-06 RX ORDER — HYDROMORPHONE HYDROCHLORIDE 2 MG/ML
INJECTION, SOLUTION INTRAMUSCULAR; INTRAVENOUS; SUBCUTANEOUS PRN
Status: DISCONTINUED | OUTPATIENT
Start: 2022-04-06 | End: 2022-04-06 | Stop reason: SURG

## 2022-04-06 RX ORDER — CEFAZOLIN SODIUM 1 G/3ML
INJECTION, POWDER, FOR SOLUTION INTRAMUSCULAR; INTRAVENOUS PRN
Status: DISCONTINUED | OUTPATIENT
Start: 2022-04-06 | End: 2022-04-06 | Stop reason: SURG

## 2022-04-06 RX ORDER — LIDOCAINE HYDROCHLORIDE 40 MG/ML
SOLUTION TOPICAL PRN
Status: DISCONTINUED | OUTPATIENT
Start: 2022-04-06 | End: 2022-04-06 | Stop reason: SURG

## 2022-04-06 RX ADMIN — EPHEDRINE SULFATE 10 MG: 50 INJECTION, SOLUTION INTRAVENOUS at 17:28

## 2022-04-06 RX ADMIN — SODIUM CHLORIDE, POTASSIUM CHLORIDE, SODIUM LACTATE AND CALCIUM CHLORIDE: 600; 310; 30; 20 INJECTION, SOLUTION INTRAVENOUS at 16:32

## 2022-04-06 RX ADMIN — DEXTROSE AND SODIUM CHLORIDE: 5; 450 INJECTION, SOLUTION INTRAVENOUS at 21:18

## 2022-04-06 RX ADMIN — METOCLOPRAMIDE 10 MG: 5 INJECTION, SOLUTION INTRAMUSCULAR; INTRAVENOUS at 05:14

## 2022-04-06 RX ADMIN — HYDROMORPHONE HYDROCHLORIDE 0.5 MG: 2 INJECTION INTRAMUSCULAR; INTRAVENOUS; SUBCUTANEOUS at 17:05

## 2022-04-06 RX ADMIN — SODIUM CHLORIDE: 9 INJECTION, SOLUTION INTRAVENOUS at 08:45

## 2022-04-06 RX ADMIN — ONDANSETRON 4 MG: 2 INJECTION INTRAMUSCULAR; INTRAVENOUS at 17:12

## 2022-04-06 RX ADMIN — HYDROMORPHONE HYDROCHLORIDE 1 MG: 2 INJECTION INTRAMUSCULAR; INTRAVENOUS; SUBCUTANEOUS at 16:36

## 2022-04-06 RX ADMIN — FENTANYL CITRATE 50 MCG: 50 INJECTION, SOLUTION INTRAMUSCULAR; INTRAVENOUS at 18:01

## 2022-04-06 RX ADMIN — OXYCODONE HYDROCHLORIDE 10 MG: 5 SOLUTION ORAL at 18:00

## 2022-04-06 RX ADMIN — PANTOPRAZOLE SODIUM 40 MG: 40 INJECTION, POWDER, FOR SOLUTION INTRAVENOUS at 05:14

## 2022-04-06 RX ADMIN — GABAPENTIN 300 MG: 300 CAPSULE ORAL at 20:13

## 2022-04-06 RX ADMIN — EPHEDRINE SULFATE 10 MG: 50 INJECTION, SOLUTION INTRAVENOUS at 17:27

## 2022-04-06 RX ADMIN — ONDANSETRON 4 MG: 2 INJECTION INTRAMUSCULAR; INTRAVENOUS at 18:00

## 2022-04-06 RX ADMIN — Medication 1000 UNITS: at 05:14

## 2022-04-06 RX ADMIN — LIDOCAINE HYDROCHLORIDE 60 MG: 20 INJECTION, SOLUTION EPIDURAL; INFILTRATION; INTRACAUDAL at 16:36

## 2022-04-06 RX ADMIN — SUCRALFATE 1 G: 1 SUSPENSION ORAL at 12:39

## 2022-04-06 RX ADMIN — SUCRALFATE 1 G: 1 SUSPENSION ORAL at 23:26

## 2022-04-06 RX ADMIN — CEFAZOLIN 2 G: 330 INJECTION, POWDER, FOR SOLUTION INTRAMUSCULAR; INTRAVENOUS at 16:36

## 2022-04-06 RX ADMIN — PROPOFOL 180 MG: 10 INJECTION, EMULSION INTRAVENOUS at 16:36

## 2022-04-06 RX ADMIN — DEXAMETHASONE SODIUM PHOSPHATE 8 MG: 4 INJECTION, SOLUTION INTRA-ARTICULAR; INTRALESIONAL; INTRAMUSCULAR; INTRAVENOUS; SOFT TISSUE at 16:39

## 2022-04-06 RX ADMIN — SUGAMMADEX 150 MG: 100 INJECTION, SOLUTION INTRAVENOUS at 17:24

## 2022-04-06 RX ADMIN — MEPERIDINE HYDROCHLORIDE 12.5 MG: 25 INJECTION INTRAMUSCULAR; INTRAVENOUS; SUBCUTANEOUS at 18:05

## 2022-04-06 RX ADMIN — LIDOCAINE HYDROCHLORIDE 4 ML: 40 SOLUTION TOPICAL at 16:36

## 2022-04-06 RX ADMIN — SUCRALFATE 1 G: 1 SUSPENSION ORAL at 05:14

## 2022-04-06 RX ADMIN — FENTANYL CITRATE 50 MCG: 50 INJECTION, SOLUTION INTRAMUSCULAR; INTRAVENOUS at 18:24

## 2022-04-06 RX ADMIN — LORAZEPAM 1 MG: 2 INJECTION INTRAMUSCULAR; INTRAVENOUS at 22:26

## 2022-04-06 RX ADMIN — LORAZEPAM 1 MG: 2 INJECTION INTRAMUSCULAR; INTRAVENOUS at 10:29

## 2022-04-06 RX ADMIN — EPHEDRINE SULFATE 10 MG: 50 INJECTION, SOLUTION INTRAVENOUS at 17:26

## 2022-04-06 RX ADMIN — TRAMADOL HYDROCHLORIDE 50 MG: 50 TABLET, COATED ORAL at 20:13

## 2022-04-06 RX ADMIN — OXYCODONE HYDROCHLORIDE 5 MG: 5 TABLET ORAL at 22:26

## 2022-04-06 RX ADMIN — ROCURONIUM BROMIDE 50 MG: 10 INJECTION, SOLUTION INTRAVENOUS at 16:36

## 2022-04-06 ASSESSMENT — ENCOUNTER SYMPTOMS
COUGH: 0
FOCAL WEAKNESS: 0
ABDOMINAL PAIN: 1
HEMOPTYSIS: 0
TREMORS: 0
DOUBLE VISION: 0
SPUTUM PRODUCTION: 0
NAUSEA: 1
BACK PAIN: 0
PHOTOPHOBIA: 0
FEVER: 0
CHILLS: 0
HEARTBURN: 1
FLANK PAIN: 0
WEIGHT LOSS: 1
HALLUCINATIONS: 0
POLYDIPSIA: 0
PALPITATIONS: 0
ORTHOPNEA: 0
BRUISES/BLEEDS EASILY: 0
VOMITING: 1
DIARRHEA: 0
HEADACHES: 0
SPEECH CHANGE: 0
NECK PAIN: 0
CONSTIPATION: 0
NERVOUS/ANXIOUS: 0
BLURRED VISION: 0

## 2022-04-06 ASSESSMENT — LIFESTYLE VARIABLES: SUBSTANCE_ABUSE: 0

## 2022-04-06 ASSESSMENT — PAIN DESCRIPTION - PAIN TYPE
TYPE: ACUTE PAIN
TYPE: SURGICAL PAIN

## 2022-04-06 NOTE — PROGRESS NOTES
Receiving hydration, plan laparoscopy release of fundoplication today. He understands this will hopefully address some of the symptoms but not the global chronic abdominal pain and dysmotility.

## 2022-04-06 NOTE — PROGRESS NOTES
[facility] Medicine Daily Progress Note    Date of Service  4/6/2022    Chief Complaint  Sudeep West is a 36 y.o. male admitted 4/4/2022 with abdominal pain    Hospital Course  Sudeep West is a 36 y.o. male with past medical history of hiatal hernia, GERD, status post Nissen fundoplication 3/16 by Dr. Carreon who presented 4/4/2022 with upper abdominal pain, nausea, vomiting that has been going on ever since that surgery, weight loss 14 pounds, generalized weakness.  He has only been able to tolerate liquids such or Powerade to water, while solid food causing the pain in the chest and in the upper stomach, sharp and dull and burning, without alleviating aggravating factors.  He would vomit immediately after eating or drinking or a few minutes later.  After surgery he was readmitted on 3/18 with the same symptoms, was evaluated with CT of the abdomen and pelvis showing postsurgical changes, and upper GI follow-through studies, showing some delayed gastric emptying.  ERP spoke to Dr Carreon, who did not recommend any additional surgery at this time.  He agreed to see the patient tomorrow morning after admission to the hospital.  Patient states that he was seen at [facility] emergency department last Wednesday, where she had CT of the abdomen pelvis repeated, reportedly showing postsurgical changes, as well as some small lesions in the liver and spleen, and some fluid in the pelvis.  I offered him to repeat CT of the abdomen pelvis tonight, but he would like to wait for consultation of Dr Carreon.  I discussed the plan of care with patient.      Interval Problem Update  4/5/2022:  Appreciate surgery recommendations.  Patient to be kept n.p.o. at midnight plan surgery for 4/6/2022.  Patient provided with anxiolytics as needed.  Patient offers no acute complaints difficulty swallowing mild abdominal pain.  Awaiting surgery.    4/6/2022:  Continue present medical management patient still having significant  symptoms patient is pending surgical intervention today for release of Nissen fundoplication.  Reevaluate patient postsurgically.  I have personally seen and examined the patient at bedside. I discussed the plan of care with patient, bedside RN and charge RN.    Consultants/Specialty  general surgery    Code Status  Full Code    Disposition  Patient is not medically cleared for discharge.   Anticipate discharge to to home with close outpatient follow-up.  I have placed the appropriate orders for post-discharge needs.    Review of Systems  Review of Systems   Constitutional: Positive for malaise/fatigue and weight loss. Negative for chills and fever.   HENT: Negative for ear pain, hearing loss and tinnitus.    Eyes: Negative for blurred vision, double vision and photophobia.   Respiratory: Negative for cough, hemoptysis and sputum production.    Cardiovascular: Negative for chest pain, palpitations and orthopnea.   Gastrointestinal: Positive for abdominal pain, heartburn, nausea and vomiting. Negative for constipation and diarrhea.   Genitourinary: Negative for dysuria, flank pain, frequency and hematuria.   Musculoskeletal: Positive for joint pain. Negative for back pain and neck pain.   Skin: Negative for itching and rash.   Neurological: Negative for tremors, speech change, focal weakness and headaches.   Endo/Heme/Allergies: Negative for environmental allergies and polydipsia. Does not bruise/bleed easily.   Psychiatric/Behavioral: Negative for hallucinations and substance abuse. The patient is not nervous/anxious.         Physical Exam  Temp:  [36.3 °C (97.4 °F)-36.9 °C (98.5 °F)] 36.9 °C (98.5 °F)  Pulse:  [85-96] 86  Resp:  [16-19] 16  BP: (109-115)/(73-80) 110/73  SpO2:  [97 %-100 %] 99 %    Physical Exam  Vitals and nursing note reviewed.   Constitutional:       General: He is not in acute distress.     Appearance: Normal appearance.      Comments: Thin appearing   HENT:      Head: Normocephalic and  atraumatic.      Nose: Nose normal.      Mouth/Throat:      Mouth: Mucous membranes are dry.   Eyes:      Extraocular Movements: Extraocular movements intact.      Pupils: Pupils are equal, round, and reactive to light.   Cardiovascular:      Rate and Rhythm: Normal rate and regular rhythm.   Pulmonary:      Effort: Pulmonary effort is normal.      Breath sounds: Normal breath sounds.   Abdominal:      General: Abdomen is flat. There is no distension.      Tenderness: There is abdominal tenderness in the epigastric area. There is no guarding or rebound.   Musculoskeletal:         General: No swelling or deformity. Normal range of motion.      Cervical back: Normal range of motion and neck supple.   Skin:     General: Skin is warm and dry.   Neurological:      General: No focal deficit present.      Mental Status: He is alert and oriented to person, place, and time.   Psychiatric:         Mood and Affect: Mood is depressed.         Behavior: Behavior normal.         Fluids    Intake/Output Summary (Last 24 hours) at 4/6/2022 1443  Last data filed at 4/5/2022 1945  Gross per 24 hour   Intake 260 ml   Output --   Net 260 ml       Laboratory  Recent Labs     04/04/22  1740 04/05/22  0146   WBC 9.3 5.9   RBC 4.66* 3.86*   HEMOGLOBIN 13.8* 11.4*   HEMATOCRIT 42.1 35.2*   MCV 90.3 91.2   MCH 29.6 29.5   MCHC 32.8* 32.4*   RDW 39.6 39.8   PLATELETCT 437 321   MPV 8.9* 8.9*     Recent Labs     04/04/22  1740 04/05/22  0146   SODIUM 141 140   POTASSIUM 4.1 3.8   CHLORIDE 104 106   CO2 22 23   GLUCOSE 100* 136*   BUN 13 11   CREATININE 0.81 0.74   CALCIUM 9.8 9.0                   Imaging  [id number] VIEW   Final Result         1.  Moderate stool in the colon suggests changes of constipation, otherwise nonspecific bowel gas pattern           Assessment/Plan  * Abdominal pain- (present on admission)  Assessment & Plan  Status post severe symptomatic hiatal hernia repair 3/16 by   Has been experiencing upper abdominal  and chest pain worsening after food intake, nausea, vomiting, inability to hold down food or medications, weight loss 14 pounds  Has been admitted with the same symptoms on 3/18, was evaluated with CT of the abdomen pelvis with contrast and upper GI follow-through study, showing delayed gastric emptying and postsurgical changes  Plan:  Abdominal x-ray to rule out small bowel obstruction  IV rehydration  IV Protonix 40 mg once a day  Trial of IV metoclopramide  Sucralfate  Zofran as needed  Clear liquid diet as tolerated  General surgery/Dr Carreon will consult tomorrow  4/5/2022:  Plan surgery on 4/6/2022 n.p.o. at midnight holding chemical anticoagulation at this point.  Continue with sequential pressure devices.  Reevaluate after surgery.      Severe protein-calorie malnutrition (HCC)  Assessment & Plan  Patient states that he lost 14 pounds in 2 weeks, BMI 14.87  Dietitian consult  Supplements,  Thiamine supplementation       VTE prophylaxis: SCDs/TEDs and pharmacologic prophylaxis contraindicated due to Impending surgery    I have performed a physical exam and reviewed and updated ROS and Plan today (4/6/2022). In review of yesterday's note (4/5/2022), there are no changes except as documented above.

## 2022-04-06 NOTE — ANESTHESIA PROCEDURE NOTES
Airway    Date/Time: 4/6/2022 4:36 PM  Performed by: Salomón Ellsworth M.D.  Authorized by: Salomón Ellsworth M.D.     Location:  OR  Urgency:  Elective  Difficult Airway: No    Indications for Airway Management:  Anesthesia      Spontaneous Ventilation: absent    Sedation Level:  Deep  Preoxygenated: Yes    Patient Position:  Sniffing  Mask Difficulty Assessment:  1 - vent by mask  Final Airway Type:  Endotracheal airway  Final Endotracheal Airway:  ETT  Cuffed: Yes    Technique Used for Successful ETT Placement:  Direct laryngoscopy    Insertion Site:  Oral  Blade Type:  Mic  Laryngoscope Blade/Videolaryngoscope Blade Size:  3  ETT Size (mm):  7.5  Measured from:  Teeth  ETT to Teeth (cm):  25  Placement Verified by: auscultation and capnometry    Cormack-Lehane Classification:  Grade I - full view of glottis  Number of Attempts at Approach:  1

## 2022-04-06 NOTE — THERAPY
Missed Therapy     Patient Name: Sudeep West  Age:  36 y.o., Sex:  male  Medical Record #: [id number]  Today's Date: 4/6/2022    Discussed missed therapy with TONG Chambers.       04/06/22 0737   Treatment Variance   Reason For Missed Therapy Medical - Other (Please Comment)  (Strict NPO, anticipated for procedure later today)   Total Time Spent   Total Time Spent (Mins) 5   Interdisciplinary Plan of Care Collaboration   IDT Collaboration with  Nursing   Collaboration Comments Order received for CSE. Chart reviewed and discussed with RN. Pt with recent 14lbs weight loss after Nissen procedure due to frequent vomitting and reported swallowing difficulty. CSE may be appropriate, however pt strict NPO pending surgery later this evening for possible hernia repair and/or reversal. SLP will plan for follow up a later date when pt is able to participate, if still indicated. RN in agreement and to notify SLP if procedure is postponed. Thanks   Session Information   Date / Session Number 4/6/22 Note Only

## 2022-04-06 NOTE — OP REPORT
NAME:  Sudeep West  MRN:  [id number]  :  1985      DATE OF OPERATION: 2022    PREOPERATIVE DIAGNOSIS:  Dysphagia, Poor PO intake, recent fundoplication    POSTOPERATIVE DIAGNOSIS: Recent fundoplication within normal limits    OPERATION PERFORMED:   1. Laparoscopic examination of recent paraesophageal hiatal hernia repair.   2. Release of fundoplication   3. Esophagogastric Dilation to 60 Micronesian    SURGEON: Surendra Carreon MD    ASSISTANT:  Mable Blanco PA-C, PA-C    ANESTHESIOLOGIST:  Anesthesiologist: Salomón Ellsworth M.D.    ANESTHESIA: General endotracheal anesthesia.     SPECIMEN: none    ESTIMATED BLOOD LOSS: <10cc.     INDICATIONS: The patient is a 36 y.o. male with a diagnosis of recently repaired large hiatal hernia. He comes today for surgical re-evaluation due to severe dysphagia and poor tolerance, weight loss and regurgitation.  He is taken to the operating room today for Laparoscopic examination of Nissen Fundoplication with possible release or revision and bougie dilation.    DETAILS OF PROCEDURE: After an extensive informed consent discussion process, the patient was brought to the operating room. She was placed in the supine position on the operating table. After induction of general anesthesia and placement of an endotracheal tube, the abdomen was prepped and draped in the usual sterile fashion. After administration of intravenous antibiotics, a bladeless optical entry trocar was carefully inserted into the abdomen. Pneumoperitoneum was established in the usual fashion. A bladeless 5 mm separator trocar was introduced. The laparoscope was introduced. Three additional separator trocars were placed in the upper abdomen and a 5 mm epigastric Tess-type liver retractor was placed to elevate the left lateral segment of the liver,   it was secured to the patient's right side with a robot arm.     Careful examination demonstrated an intact fundoplication without discernable abnormality.  The table was placed in reverse Trendelenburg position and gradually the wrap and crural repair and biograft were meticulously inspected.  There was no esophageal shortening.     A loose floppy Nissen fundoplication was present, as examined over the 40 Fr bougie. The anterior seromuscular sutures were released to release the wrap anteriorly as well as releasing the anchoring sutures from the wrap to the graft and crura. The 50 Belarusian tapered bougie was passed easily into the distal stomach.  The graft and anterior cruroplasty were released further with metzenbaum scissors so that an instrument could pass up alongside the esophagus easily.    After final inspections demonstrated a nice result with excellent hemostasis and floppy comfortable released wrap, no serosal injuries or entoerotomies/gastrotomies, the bougie was removed. A tapered 60 Fr tapered bougie was then gently passed and easily advanced into the distal stomach. The graft and cruroplasty remained intact, now loosened, but were not at all constricting. The liver retractor was then removed. The pneumoperitoneum was allowed to escape. The ports were removed under direct vision. The port sites were irrigated and closed with Vicryl sutures. Steri-Strips and sterile dressings were applied.    The patient tolerated the procedure well and there were no apparent complications. All sponge, needle, and instrument counts were correct on 2 separate occasions. He was awakened, extubated, and transferred to the recovery room in satisfactory condition.       ____________________________________   Surendra Carreon MD  DD: 4/6/2022  4:18 PM    CC:  [facility];

## 2022-04-06 NOTE — PROGRESS NOTES
Pt. Received in bed awake, orientedx4. With complaints of mild pain on abdomen but tolerable. Pt. Has nausea but no vomiting at the moment. Pt. Plan for laparoscopy tomorrow to release fundoplication. Pt. Is on NPO at midnight. Educated about fall risk. Due meds given and tolerated. Needs attended.

## 2022-04-06 NOTE — PROGRESS NOTES
Pt down to surgery w/ transport, Pre-OP checklist done, CHG wipe completed by patient around noon, toileted himself right before leaving.

## 2022-04-06 NOTE — PROGRESS NOTES
Report given to Nereida at pre op. Pt. Aware of the plan. Day CNA informed of the CHG bath by noon.

## 2022-04-06 NOTE — CARE PLAN
The patient is Stable - Low risk of patient condition declining or worsening    Shift Goals  Clinical Goals: Patient will remain NPO until surgery today  Patient Goals: Relax and rest  Family Goals: NA    Progress made toward(s) clinical / shift goals:    Problem: Psychosocial  Goal: Patient's level of anxiety will decrease  Outcome: Progressing   Pt NPO till surgery, IV ativan given once helped patient rest and get in right mindset for surgery. Cool, calm and collected when heading down w/ transport for sx.

## 2022-04-06 NOTE — ANESTHESIA PREPROCEDURE EVALUATION
Case: [id number] Date/Time: 04/06/22 1700    Procedure: LAPAROSCOPY - DIAGNOSTIC    Location: [facility] OR 10 / SURGERY [facility]    Surgeons: Surendra Carreon M.D.          Relevant Problems   GI   (positive) Hiatal hernia      Other   (positive) Abdominal pain   (positive) Severe protein-calorie malnutrition (HCC)       Physical Exam    Airway   Mallampati: II  TM distance: >3 FB  Neck ROM: full       Cardiovascular - normal exam  Rhythm: regular  Rate: normal  (-) murmur     Dental - normal exam           Pulmonary - normal exam  Breath sounds clear to auscultation     Abdominal   (+) scaphoid     Neurological - normal exam                 Anesthesia Plan    ASA 2       Plan - general       Airway plan will be ETT          Induction: intravenous    Postoperative Plan: Postoperative administration of opioids is intended.    Pertinent diagnostic labs and testing reviewed    Informed Consent:    Anesthetic plan and risks discussed with patient.

## 2022-04-07 ENCOUNTER — APPOINTMENT (OUTPATIENT)
Dept: RADIOLOGY | Facility: MEDICAL CENTER | Age: 37
DRG: 326 | End: 2022-04-07
Attending: STUDENT IN AN ORGANIZED HEALTH CARE EDUCATION/TRAINING PROGRAM
Payer: MEDICAID

## 2022-04-07 LAB
ALBUMIN SERPL BCP-MCNC: 4.3 G/DL (ref 3.2–4.9)
ALBUMIN/GLOB SERPL: 1.7 G/DL
ALP SERPL-CCNC: 43 U/L (ref 30–99)
ALT SERPL-CCNC: 30 U/L (ref 2–50)
ANION GAP SERPL CALC-SCNC: 12 MMOL/L (ref 7–16)
AST SERPL-CCNC: 38 U/L (ref 12–45)
BASOPHILS # BLD AUTO: 0.1 % (ref 0–1.8)
BASOPHILS # BLD: 0.01 K/UL (ref 0–0.12)
BILIRUB SERPL-MCNC: 0.8 MG/DL (ref 0.1–1.5)
BUN SERPL-MCNC: 5 MG/DL (ref 8–22)
CALCIUM SERPL-MCNC: 9.5 MG/DL (ref 8.5–10.5)
CHLORIDE SERPL-SCNC: 97 MMOL/L (ref 96–112)
CO2 SERPL-SCNC: 26 MMOL/L (ref 20–33)
CREAT SERPL-MCNC: 0.66 MG/DL (ref 0.5–1.4)
EOSINOPHIL # BLD AUTO: 0 K/UL (ref 0–0.51)
EOSINOPHIL NFR BLD: 0 % (ref 0–6.9)
ERYTHROCYTE [DISTWIDTH] IN BLOOD BY AUTOMATED COUNT: 37.6 FL (ref 35.9–50)
GFR SERPLBLD CREATININE-BSD FMLA CKD-EPI: 125 ML/MIN/1.73 M 2
GLOBULIN SER CALC-MCNC: 2.6 G/DL (ref 1.9–3.5)
GLUCOSE SERPL-MCNC: 135 MG/DL (ref 65–99)
HCT VFR BLD AUTO: 36.5 % (ref 42–52)
HGB BLD-MCNC: 12.2 G/DL (ref 14–18)
IMM GRANULOCYTES # BLD AUTO: 0.06 K/UL (ref 0–0.11)
IMM GRANULOCYTES NFR BLD AUTO: 0.5 % (ref 0–0.9)
LYMPHOCYTES # BLD AUTO: 1.01 K/UL (ref 1–4.8)
LYMPHOCYTES NFR BLD: 9 % (ref 22–41)
MCH RBC QN AUTO: 29.7 PG (ref 27–33)
MCHC RBC AUTO-ENTMCNC: 33.4 G/DL (ref 33.7–35.3)
MCV RBC AUTO: 88.8 FL (ref 81.4–97.8)
MONOCYTES # BLD AUTO: 1.32 K/UL (ref 0–0.85)
MONOCYTES NFR BLD AUTO: 11.8 % (ref 0–13.4)
NEUTROPHILS # BLD AUTO: 8.78 K/UL (ref 1.82–7.42)
NEUTROPHILS NFR BLD: 78.6 % (ref 44–72)
NRBC # BLD AUTO: 0 K/UL
NRBC BLD-RTO: 0 /100 WBC
PLATELET # BLD AUTO: 347 K/UL (ref 164–446)
PMV BLD AUTO: 9.2 FL (ref 9–12.9)
POTASSIUM SERPL-SCNC: 3.7 MMOL/L (ref 3.6–5.5)
PROT SERPL-MCNC: 6.9 G/DL (ref 6–8.2)
RBC # BLD AUTO: 4.11 M/UL (ref 4.7–6.1)
SODIUM SERPL-SCNC: 135 MMOL/L (ref 135–145)
WBC # BLD AUTO: 11.2 K/UL (ref 4.8–10.8)

## 2022-04-07 PROCEDURE — A9270 NON-COVERED ITEM OR SERVICE: HCPCS | Performed by: INTERNAL MEDICINE

## 2022-04-07 PROCEDURE — 92610 EVALUATE SWALLOWING FUNCTION: CPT

## 2022-04-07 PROCEDURE — 85025 COMPLETE CBC W/AUTO DIFF WBC: CPT

## 2022-04-07 PROCEDURE — A9270 NON-COVERED ITEM OR SERVICE: HCPCS | Performed by: STUDENT IN AN ORGANIZED HEALTH CARE EDUCATION/TRAINING PROGRAM

## 2022-04-07 PROCEDURE — 700111 HCHG RX REV CODE 636 W/ 250 OVERRIDE (IP): Performed by: INTERNAL MEDICINE

## 2022-04-07 PROCEDURE — 99232 SBSQ HOSP IP/OBS MODERATE 35: CPT | Performed by: STUDENT IN AN ORGANIZED HEALTH CARE EDUCATION/TRAINING PROGRAM

## 2022-04-07 PROCEDURE — C9113 INJ PANTOPRAZOLE SODIUM, VIA: HCPCS | Performed by: INTERNAL MEDICINE

## 2022-04-07 PROCEDURE — 700105 HCHG RX REV CODE 258: Performed by: INTERNAL MEDICINE

## 2022-04-07 PROCEDURE — 770001 HCHG ROOM/CARE - MED/SURG/GYN PRIV*

## 2022-04-07 PROCEDURE — 80053 COMPREHEN METABOLIC PANEL: CPT

## 2022-04-07 PROCEDURE — 700102 HCHG RX REV CODE 250 W/ 637 OVERRIDE(OP): Performed by: STUDENT IN AN ORGANIZED HEALTH CARE EDUCATION/TRAINING PROGRAM

## 2022-04-07 PROCEDURE — 700102 HCHG RX REV CODE 250 W/ 637 OVERRIDE(OP): Performed by: INTERNAL MEDICINE

## 2022-04-07 RX ORDER — OMEPRAZOLE 20 MG/1
20 CAPSULE, DELAYED RELEASE ORAL DAILY
Status: DISCONTINUED | OUTPATIENT
Start: 2022-04-08 | End: 2022-04-08 | Stop reason: HOSPADM

## 2022-04-07 RX ADMIN — ONDANSETRON 4 MG: 4 TABLET, ORALLY DISINTEGRATING ORAL at 12:45

## 2022-04-07 RX ADMIN — ONDANSETRON 4 MG: 4 TABLET, ORALLY DISINTEGRATING ORAL at 07:58

## 2022-04-07 RX ADMIN — OXYCODONE HYDROCHLORIDE 5 MG: 5 TABLET ORAL at 03:37

## 2022-04-07 RX ADMIN — PANTOPRAZOLE SODIUM 40 MG: 40 INJECTION, POWDER, FOR SOLUTION INTRAVENOUS at 06:19

## 2022-04-07 RX ADMIN — SENNOSIDES AND DOCUSATE SODIUM 2 TABLET: 50; 8.6 TABLET ORAL at 17:02

## 2022-04-07 RX ADMIN — Medication 1000 UNITS: at 06:19

## 2022-04-07 RX ADMIN — SUCRALFATE 1 G: 1 SUSPENSION ORAL at 12:45

## 2022-04-07 RX ADMIN — GABAPENTIN 300 MG: 300 CAPSULE ORAL at 20:24

## 2022-04-07 RX ADMIN — SUCRALFATE 1 G: 1 SUSPENSION ORAL at 17:02

## 2022-04-07 RX ADMIN — SODIUM CHLORIDE: 9 INJECTION, SOLUTION INTRAVENOUS at 08:01

## 2022-04-07 RX ADMIN — SUCRALFATE 1 G: 1 SUSPENSION ORAL at 06:19

## 2022-04-07 RX ADMIN — HYDROMORPHONE HYDROCHLORIDE 0.5 MG: 1 INJECTION, SOLUTION INTRAMUSCULAR; INTRAVENOUS; SUBCUTANEOUS at 06:22

## 2022-04-07 RX ADMIN — OXYCODONE HYDROCHLORIDE 5 MG: 5 TABLET ORAL at 12:45

## 2022-04-07 RX ADMIN — HYDROMORPHONE HYDROCHLORIDE 0.5 MG: 1 INJECTION, SOLUTION INTRAMUSCULAR; INTRAVENOUS; SUBCUTANEOUS at 22:38

## 2022-04-07 RX ADMIN — SENNOSIDES AND DOCUSATE SODIUM 2 TABLET: 50; 8.6 TABLET ORAL at 06:19

## 2022-04-07 ASSESSMENT — ENCOUNTER SYMPTOMS
FLANK PAIN: 0
WEIGHT LOSS: 1
PHOTOPHOBIA: 0
BRUISES/BLEEDS EASILY: 0
ABDOMINAL PAIN: 1
NECK PAIN: 0
DIARRHEA: 0
PALPITATIONS: 0
CHILLS: 0
HEMOPTYSIS: 0
BLURRED VISION: 0
NAUSEA: 0
NERVOUS/ANXIOUS: 0
VOMITING: 1
HEARTBURN: 1
VOMITING: 0
ORTHOPNEA: 0
DOUBLE VISION: 0
COUGH: 0
FOCAL WEAKNESS: 0
HEARTBURN: 0
BACK PAIN: 0
HEADACHES: 0
SPEECH CHANGE: 0
NAUSEA: 1
HALLUCINATIONS: 0
CONSTIPATION: 0
SPUTUM PRODUCTION: 0
POLYDIPSIA: 0
TREMORS: 0
FEVER: 0

## 2022-04-07 ASSESSMENT — PAIN DESCRIPTION - PAIN TYPE
TYPE: SURGICAL PAIN
TYPE: SURGICAL PAIN
TYPE: ACUTE PAIN

## 2022-04-07 ASSESSMENT — LIFESTYLE VARIABLES: SUBSTANCE_ABUSE: 0

## 2022-04-07 NOTE — ANESTHESIA POSTPROCEDURE EVALUATION
Patient: Sudeep West    Procedure Summary     Date: 04/06/22 Room / Location: [place] [address] / SURGERY [facility]    Anesthesia Start: 1632 Anesthesia Stop: 1740    Procedure: LAPAROSCOPY - DIAGNOSTIC (Abdomen) Diagnosis: (dysphagia)    Surgeons: Surendra Carreon M.D. Responsible Provider: Salomón Ellsworth M.D.    Anesthesia Type: general ASA Status: 2          Final Anesthesia Type: general  Last vitals  BP   Blood Pressure: 122/75    Temp   36.3 °C (97.4 °F)    Pulse   66   Resp   19    SpO2   99 %      Anesthesia Post Evaluation    Patient location during evaluation: PACU  Patient participation: complete - patient participated  Level of consciousness: sleepy but conscious    Airway patency: patent  Anesthetic complications: no  Cardiovascular status: hemodynamically stable  Respiratory status: acceptable  Hydration status: euvolemic    PONV: none          No complications documented.     Nurse Pain Score: 7 (NPRS)

## 2022-04-07 NOTE — CARE PLAN
The patient is Stable - Low risk of patient condition declining or worsening    Shift Goals  Clinical Goals: Patient will remain NPO until surgery today  Patient Goals: Relax and rest  Family Goals: NA    Progress made toward(s) clinical / shift goals:  Pt remains NPO except sips.    Patient is not progressing towards the following goals:

## 2022-04-07 NOTE — PROGRESS NOTES
Assumed care of pt after receiving report from night shift RN. Pt is A&Ox 4 on RA. Pt states 6/10 pain in abdomen - declines intervention at this time. Pt c/o nausea - medicated per MAR. Updated pt on POC - verbalized understanding. Pt demonstrates appropriate use of call light. Frame alarm on, bed is locked and in lowest position, call light within reach, fall precautions in place. All needs met at this time.

## 2022-04-07 NOTE — PROGRESS NOTES
[facility] Medicine Daily Progress Note    Date of Service  4/7/2022    Chief Complaint  Sudeep West is a 36 y.o. male admitted 4/4/2022 with abdominal pain    Hospital Course  Sudeep West is a 36 y.o. male with past medical history of hiatal hernia, GERD, status post Nissen fundoplication 3/16 by Dr. Carreon who presented 4/4/2022 with upper abdominal pain, nausea, vomiting that has been going on ever since that surgery, weight loss 14 pounds, generalized weakness.  He has only been able to tolerate liquids such or Powerade to water, while solid food causing the pain in the chest and in the upper stomach, sharp and dull and burning, without alleviating aggravating factors.  He would vomit immediately after eating or drinking or a few minutes later.  After surgery he was readmitted on 3/18 with the same symptoms, was evaluated with CT of the abdomen and pelvis showing postsurgical changes, and upper GI follow-through studies, showing some delayed gastric emptying.  ERP spoke to Dr Carreon, who did not recommend any additional surgery at this time.  He agreed to see the patient tomorrow morning after admission to the hospital.  Patient states that he was seen at [facility] emergency department last Wednesday, where she had CT of the abdomen pelvis repeated, reportedly showing postsurgical changes, as well as some small lesions in the liver and spleen, and some fluid in the pelvis.  I offered him to repeat CT of the abdomen pelvis tonight, but he would like to wait for consultation of Dr Carreon.  I discussed the plan of care with patient.      Interval Problem Update  4/5/2022:  Appreciate surgery recommendations.  Patient to be kept n.p.o. at midnight plan surgery for 4/6/2022.  Patient provided with anxiolytics as needed.  Patient offers no acute complaints difficulty swallowing mild abdominal pain.  Awaiting surgery.    4/6/2022:  Continue present medical management patient still having significant  symptoms patient is pending surgical intervention today for release of Nissen fundoplication.  Reevaluate patient postsurgically.    4/7/2022:  Patient able to tolerate some oral liquids however is pending speech evaluation.  Patient is presently postoperative day #2 from Nissen fundoplication release.  Patient offers no other complaints this time.  Patient is also pending evaluation by dietary.  Get repeat CBC in the morning is noticed uptrending leukocytosis however this could be secondary to demargination secondary to stress reaction secondary to surgery.  I have personally seen and examined the patient at bedside. I discussed the plan of care with patient, bedside RN and charge RN.    Consultants/Specialty  general surgery    Code Status  Full Code    Disposition  Patient is not medically cleared for discharge.   Anticipate discharge to to home with close outpatient follow-up.  I have placed the appropriate orders for post-discharge needs.    Review of Systems  Review of Systems   Constitutional: Positive for malaise/fatigue and weight loss. Negative for chills and fever.   HENT: Negative for ear pain, hearing loss and tinnitus.    Eyes: Negative for blurred vision, double vision and photophobia.   Respiratory: Negative for cough, hemoptysis and sputum production.    Cardiovascular: Negative for chest pain, palpitations and orthopnea.   Gastrointestinal: Positive for abdominal pain. Negative for constipation, diarrhea, heartburn, nausea and vomiting.   Genitourinary: Negative for dysuria, flank pain, frequency and hematuria.   Musculoskeletal: Positive for joint pain. Negative for back pain and neck pain.   Skin: Negative for itching and rash.   Neurological: Negative for tremors, speech change, focal weakness and headaches.   Endo/Heme/Allergies: Negative for environmental allergies and polydipsia. Does not bruise/bleed easily.   Psychiatric/Behavioral: Negative for hallucinations and substance abuse. The  patient is not nervous/anxious.         Physical Exam  Temp:  [36.3 °C (97.4 °F)-36.9 °C (98.4 °F)] 36.4 °C (97.5 °F)  Pulse:  [64-97] 93  Resp:  [13-20] 18  BP: (108-154)/(70-88) 108/81  SpO2:  [92 %-99 %] 95 %    Physical Exam  Vitals and nursing note reviewed.   Constitutional:       General: He is not in acute distress.     Appearance: Normal appearance.      Comments: Thin appearing   HENT:      Head: Normocephalic and atraumatic.      Nose: Nose normal.      Mouth/Throat:      Mouth: Mucous membranes are dry.   Eyes:      Extraocular Movements: Extraocular movements intact.      Pupils: Pupils are equal, round, and reactive to light.   Cardiovascular:      Rate and Rhythm: Normal rate and regular rhythm.   Pulmonary:      Effort: Pulmonary effort is normal.      Breath sounds: Normal breath sounds.   Abdominal:      General: Abdomen is flat. There is no distension.      Tenderness: There is abdominal tenderness in the epigastric area. There is no guarding or rebound.   Musculoskeletal:         General: No swelling or deformity. Normal range of motion.      Cervical back: Normal range of motion and neck supple.   Skin:     General: Skin is warm and dry.   Neurological:      General: No focal deficit present.      Mental Status: He is alert and oriented to person, place, and time.   Psychiatric:         Mood and Affect: Mood is depressed.         Behavior: Behavior normal.         Fluids    Intake/Output Summary (Last 24 hours) at 4/7/2022 1313  Last data filed at 4/7/2022 0739  Gross per 24 hour   Intake 1750 ml   Output 2260 ml   Net -510 ml       Laboratory  Recent Labs     04/04/22  1740 04/05/22  0146 04/07/22  0731   WBC 9.3 5.9 11.2*   RBC 4.66* 3.86* 4.11*   HEMOGLOBIN 13.8* 11.4* 12.2*   HEMATOCRIT 42.1 35.2* 36.5*   MCV 90.3 91.2 88.8   MCH 29.6 29.5 29.7   MCHC 32.8* 32.4* 33.4*   RDW 39.6 39.8 37.6   PLATELETCT 437 321 347   MPV 8.9* 8.9* 9.2     Recent Labs     04/04/22  1740 04/05/22  0146  04/07/22  0731   SODIUM 141 140 135   POTASSIUM 4.1 3.8 3.7   CHLORIDE 104 106 97   CO2 22 23 26   GLUCOSE 100* 136* 135*   BUN 13 11 5*   CREATININE 0.81 0.74 0.66   CALCIUM 9.8 9.0 9.5                   Imaging  [id number] VIEW   Final Result         1.  Moderate stool in the colon suggests changes of constipation, otherwise nonspecific bowel gas pattern           Assessment/Plan  * Abdominal pain- (present on admission)  Assessment & Plan  Status post severe symptomatic hiatal hernia repair 3/16 by   Has been experiencing upper abdominal and chest pain worsening after food intake, nausea, vomiting, inability to hold down food or medications, weight loss 14 pounds  Has been admitted with the same symptoms on 3/18, was evaluated with CT of the abdomen pelvis with contrast and upper GI follow-through study, showing delayed gastric emptying and postsurgical changes  Plan:  Abdominal x-ray to rule out small bowel obstruction  IV rehydration  IV Protonix 40 mg once a day  Trial of IV metoclopramide  Sucralfate  Zofran as needed  Clear liquid diet as tolerated  General surgery/Dr Carreon will consult tomorrow  4/5/2022:  Plan surgery on 4/6/2022 n.p.o. at midnight holding chemical anticoagulation at this point.  Continue with sequential pressure devices.  Reevaluate after surgery.      Severe protein-calorie malnutrition (HCC)  Assessment & Plan  Patient states that he lost 14 pounds in 2 weeks, BMI 14.87  Dietitian consult  Supplements,  Thiamine supplementation       VTE prophylaxis: SCDs/TEDs and pharmacologic prophylaxis contraindicated due to Impending surgery    I have performed a physical exam and reviewed and updated ROS and Plan today (4/7/2022). In review of yesterday's note (4/6/2022), there are no changes except as documented above.

## 2022-04-07 NOTE — PROGRESS NOTES
@ 03:37 Pt awake and alert requesting Prn pain medication for pain level 6/10 to abdomen. Also informed me is burping and passing gas and voiding in urinal without difficulty; denies any nausea at this time held reglan per pt request. Pt left with bed rails up x2, bed low and locked and call light within reach. Will continue to monitor patient intermittently.    DEB Mendoza RN

## 2022-04-07 NOTE — PROGRESS NOTES
Surgical Progress Note    Author: Sandra Rick P.A.-C. Date & Time created: 2022   11:40 AM     Interval Events:  Patient a disgruntled 35 y/o Male POD1 s/p 1. Laparoscopic examination of recent paraesophageal hiatal hernia repair. 2. Release of fundoplication 3. Esophagogastric Dilation to 60 [place].    Patient seen and examined. Tolerating PO water but difficulty with foods still. Currently on FLD, GI soft. Labs reviewed. VSS. Ambulating some and voiding okay.     Review of Systems   Constitutional: Positive for malaise/fatigue and weight loss. Negative for chills and fever.   HENT: Negative for ear pain, hearing loss and tinnitus.    Eyes: Negative for blurred vision, double vision and photophobia.   Respiratory: Negative for cough, hemoptysis and sputum production.    Cardiovascular: Negative for chest pain, palpitations and orthopnea.   Gastrointestinal: Positive for abdominal pain, heartburn, nausea and vomiting. Negative for constipation and diarrhea.   Genitourinary: Negative for dysuria, flank pain, frequency and hematuria.   Musculoskeletal: Positive for joint pain. Negative for back pain and neck pain.   Skin: Negative for itching and rash.   Neurological: Negative for tremors, speech change, focal weakness and headaches.   Endo/Heme/Allergies: Negative for environmental allergies and polydipsia. Does not bruise/bleed easily.   Psychiatric/Behavioral: Negative for hallucinations and substance abuse. The patient is not nervous/anxious.      Hemodynamics:  Temp (24hrs), Av.6 °C (97.9 °F), Min:36.3 °C (97.4 °F), Max:36.9 °C (98.4 °F)  Temperature: 36.4 °C (97.5 °F)  Pulse  Av  Min: 63  Max: 102   Blood Pressure: 108/81     Respiratory:    Respiration: 17, Pulse Oximetry: 95 %           Neuro:  GCS       Fluids:    Intake/Output Summary (Last 24 hours) at 2022 1140  Last data filed at 2022 0739  Gross per 24 hour   Intake 1750 ml   Output 2260 ml   Net -510 ml        Current Diet  Order   Procedures   • Diet Order Diet: Full Liquid (low fiber (GI soft))     Physical Exam  Vitals and nursing note reviewed.   Constitutional:       General: He is not in acute distress.     Appearance: Normal appearance.      Comments: Thin appearing   HENT:      Head: Normocephalic and atraumatic.      Nose: Nose normal.      Mouth/Throat:      Mouth: Mucous membranes are dry.   Eyes:      Extraocular Movements: Extraocular movements intact.      Pupils: Pupils are equal, round, and reactive to light.   Cardiovascular:      Rate and Rhythm: Normal rate and regular rhythm.   Pulmonary:      Effort: Pulmonary effort is normal.      Breath sounds: Normal breath sounds.   Abdominal:      General: Abdomen is flat. There is no distension.      Tenderness: There is abdominal tenderness in the epigastric area. There is no guarding or rebound.   Musculoskeletal:         General: No swelling or deformity. Normal range of motion.      Cervical back: Normal range of motion and neck supple.   Skin:     General: Skin is warm and dry.   Neurological:      General: No focal deficit present.      Mental Status: He is alert and oriented to person, place, and time.   Psychiatric:         Mood and Affect: Mood is depressed.         Behavior: Behavior normal.       Labs:  Recent Results (from the past 24 hour(s))   CBC WITH DIFFERENTIAL    Collection Time: 04/07/22  7:31 AM   Result Value Ref Range    WBC 11.2 (H) 4.8 - 10.8 K/uL    RBC 4.11 (L) 4.70 - 6.10 M/uL    Hemoglobin 12.2 (L) 14.0 - 18.0 g/dL    Hematocrit 36.5 (L) 42.0 - 52.0 %    MCV 88.8 81.4 - 97.8 fL    MCH 29.7 27.0 - 33.0 pg    MCHC 33.4 (L) 33.7 - 35.3 g/dL    RDW 37.6 35.9 - 50.0 fL    Platelet Count 347 164 - 446 K/uL    MPV 9.2 9.0 - 12.9 fL    Neutrophils-Polys 78.60 (H) 44.00 - 72.00 %    Lymphocytes 9.00 (L) 22.00 - 41.00 %    Monocytes 11.80 0.00 - 13.40 %    Eosinophils 0.00 0.00 - 6.90 %    Basophils 0.10 0.00 - 1.80 %    Immature Granulocytes 0.50 0.00 -  0.90 %    Nucleated RBC 0.00 /100 WBC    Neutrophils (Absolute) 8.78 (H) 1.82 - 7.42 K/uL    Lymphs (Absolute) 1.01 1.00 - 4.80 K/uL    Monos (Absolute) 1.32 (H) 0.00 - 0.85 K/uL    Eos (Absolute) 0.00 0.00 - 0.51 K/uL    Baso (Absolute) 0.01 0.00 - 0.12 K/uL    Immature Granulocytes (abs) 0.06 0.00 - 0.11 K/uL    NRBC (Absolute) 0.00 K/uL   Comp Metabolic Panel    Collection Time: 04/07/22  7:31 AM   Result Value Ref Range    Sodium 135 135 - 145 mmol/L    Potassium 3.7 3.6 - 5.5 mmol/L    Chloride 97 96 - 112 mmol/L    Co2 26 20 - 33 mmol/L    Anion Gap 12.0 7.0 - 16.0    Glucose 135 (H) 65 - 99 mg/dL    Bun 5 (L) 8 - 22 mg/dL    Creatinine 0.66 0.50 - 1.40 mg/dL    Calcium 9.5 8.5 - 10.5 mg/dL    AST(SGOT) 38 12 - 45 U/L    ALT(SGPT) 30 2 - 50 U/L    Alkaline Phosphatase 43 30 - 99 U/L    Total Bilirubin 0.8 0.1 - 1.5 mg/dL    Albumin 4.3 3.2 - 4.9 g/dL    Total Protein 6.9 6.0 - 8.2 g/dL    Globulin 2.6 1.9 - 3.5 g/dL    A-G Ratio 1.7 g/dL   ESTIMATED GFR    Collection Time: 04/07/22  7:31 AM   Result Value Ref Range    GFR (CKD-EPI) 125 >60 mL/min/1.73 m 2     Medical Decision Making, by Problem:  Active Hospital Problems    Diagnosis    • Severe protein-calorie malnutrition (HCC) [E43]    • Abdominal pain [R10.9]      Plan:  Pt is alert and oriented, NAD. Breathing unlabored. Tolerating PO.  Incisions ok. VS stable. Labs reviewed.  Leukocytosis, likely reactive. Encouraged ambulation and incentive spirometry. Pt may need to stay another night for nausea, pain control, hypoxia, will see how the day progresses. Pt also seen and examined by Dr. Carreon.    Quality Measures:  Quality-Core Measures    Discussed patient condition with Patient and Dr. Carreon

## 2022-04-07 NOTE — ANESTHESIA TIME REPORT
Anesthesia Start and Stop Event Times     Date Time Event    4/6/2022 1617 Ready for Procedure     1632 Anesthesia Start     1740 Anesthesia Stop        Responsible Staff  04/06/22    Name Role Begin End    Salomón Ellsworth M.D. Anesth 1632 1740        Overtime Reason:  no overtime (within assigned shift)    Comments:

## 2022-04-07 NOTE — CARE PLAN
Problem: Nutritional:  Goal: Achieve adequate nutritional intake  Description: Patient will consume advance diet past clears and consume 50% of meals  Outcome: Progressing     Diet advanced beyond clears to PU4/TN0. See RD note.     RD following.

## 2022-04-07 NOTE — PROGRESS NOTES
Pt alert and oriented x4 without any complications. Sites x5 C/D/I. Oxygen removed (1 liter) O2 sat 97 on Room air, pt denied any SOB.    DEB Mendoza RN

## 2022-04-07 NOTE — THERAPY
"Speech Language Pathology   Clinical Swallow Evaluation     Patient Name: Sudeep West  AGE:  36 y.o., SEX:  male  Medical Record #: [id number]  Today's Date: 4/7/2022     Precautions  Precautions: Fall Risk,Swallow Precautions ( See Comments)    Assessment  Patient is 36 y.o. male admitted 4/4 with abdominal pain, N/V, weight loss, and generalized weakness. Pt had Nissen Fundoplication on 3/16 by Dr. Carreon and has had complications since. Now s/p release of fundo, esophageal dilation on 4/6. PMHx of anemia, hiatal hernia, indigestion, p/o N/V. No previous SLP notes in EMR.     Level of Consciousness: Alert, Awake  Affect/Behavior: Appears depressed, Appropriate, Calm, Flat  Follows Directives: Yes  Orientation: Oriented x 4  Hearing: Functional hearing  Vision: Functional vision      Prior Living Situation & Level of Function:  Patient reports he lives with his dad and they help each other, as they both have medical needs.       Oral Mechanism Evaluation  Facial Symmetry: Equal  Facial Sensation: Equal  Labial Observations: WFL  Lingual Observations: Midline  Dentition: Fair, Missing posterior dentition  Comments:      Voice  Quality: WFL  Resonance: WFL  Intensity: Appropriate  Cough: WFL  Comments:      Current Method of Nutrition   Oral diet (Full liquids)      Subjective  \"I can't swallow solids\"      Assessment  Positioning: Maxwell's (60-90 degrees)  Bolus Administration: SLP  Oxygen Requirements: Room Air  Factor(s) Affecting Performance: None    Swallowing Trials  Ice: Not tested  Thin Liquid (TN0): WFL  Mildly Thick Liquid (MT2): Not tested  Liquidised (LQ3): WFL  Pureed (PU4): WFL  Minced & Moist (MM5): Not tested  Soft & Bite Sized (SB6): Not tested  Easy to Chew (EC7): Not tested  Regular (RG7): Not tested     Clinical Impressions  Patient's oral motor evaluation was WNL. Patient missing a few posterior teeth, but otherwise WNL and patient reports this does not affect chewing. Patient reports recent " "dysphagia to solids. Patient on full liquid diet. Patient consumed PO trials of thins via cup sip and straw, liquidized purees, jello (pt declined pudding d/t lactose intolerance), and soft solids (peaches). Patient consumed PO trials of liquids and liquidized purees w/out s/sx of aspiration or report of globus sensation. On jello and soft solids, patient reported increased globus sensation and pointed to lower esophagus when asked where. Patient reported this was mostly alleviated w/ thin liquid wash. No s/sx of aspiration noted on any textures trialed.        Recommendations  1.  Start PU4/TN0 diet (purees w/ thins) w/ recommendation for esophagram to further evaluate suspected esophageal dysphagia; MD aware and entered order   2.  Instrumentation: Instrumental swallow study pending clinical progress  3.  Swallowing Instructions & Precautions:   Supervision: Independent  Positioning: Fully upright and midline during oral intake, Remain upright for 30 minutes after oral intake  Medication: Whole with puree  Strategies: Small bites/sips, Alternate bites and sips  Oral Care: Q4h    Plan  Recommend Speech Therapy 3 times per week until therapy goals are met for the following treatments:  Dysphagia Training and Patient / Family / Caregiver Education.    Discharge Recommendations: Anticipate that the patient will have no further speech therapy needs after discharge from the hospital     Objective     04/07/22 1439   Precautions   Precautions Fall Risk;Swallow Precautions ( See Comments)   Vitals   O2 Delivery Device None - Room Air   Oral Motor Eval    Is Patient Able to Complete Oral Motor Eval Yes, Within Normal Limits   Laryngeal Function   Voice Quality Within Functional Limits   Volutional Cough Within Functional Limits   Excursion Upon Swallow Complete   Max Phonation Time (Seconds) 5   Patient / Family Goals   Patient / Family Goal #1 \"Food gets stuck sometimes\"   Short Term Goals   Short Term Goal # 1 Patient " will consume PU4/TN0 diet independently with no overt s/sx of aspiration.   Anticipated Discharge Needs   Discharge Recommendations Anticipate that the patient will have no further speech therapy needs after discharge from the hospital

## 2022-04-07 NOTE — DIETARY
"Nutrition Services: Update    Consult received for poor PO. Met with pt at bedside to review diet post-fundo surgery and poor PO intake. Pt shared that he was not able to eat d/t complications from the surgery, eating <25% though is now able to eat a lot better after the revision surgery. RD shared several resources with pt with meal/snack ideas that would accommodate a liquid/pureed/soft food diet. Pt appeared motivated and optimistic about increasing intake post revision surgery and diet advancment, will continue to monitor intake.     Evaluation:   1. Per SLP, start PU4/TN0 diet with recommendation for esophagram to further evaluate suspected esophageal dysphagia.  2. Labs: Glucose 135   3. Meds: NS infusion, senna, vitamin D3   4. Last BM: 4/3 per pt report; extended length since BM may impact PO intake     Malnutrition: Per previous RD note on 4/5: \"Severe acute malnutrition as evidenced by 11% weight loss in less than one month and intakes of 50% of estimated need for >5 days.  Severe protein and fat wasting as evidenced by bony prominences and depressed temporal region.\"     Recommendation/Plan:   1. Diet advancement per MD/SLP  2. Encourage intake of meals >50-75%  3. Document intake of all meals as % in ADL's.  4. Monitor weight    RD following.   "

## 2022-04-07 NOTE — OR NURSING
Patient recovered well in post-op. AAOx4. VSS, on 1L via NC. Surgical sites GM, surgical dressing in place x5 sites. Scant sanguinous drainage. Abdomen soft, tender. Surgical pain managed through PO and IV medications. Patient able to intake fluids without nausea. Father updated and discussed POC. Patient belongings retrieved and on [facility]. Report called to TONG Paiz. Awaiting transport. O2 tank full for transport.

## 2022-04-07 NOTE — CARE PLAN
The patient is Stable - Low risk of patient condition declining or worsening    Shift Goals  Clinical Goals: Advance diet as tolerated  Patient Goals: Relax and rest  Family Goals: NA    Progress made toward(s) clinical / shift goals: Pt tolerating full liquid meals, advanced to puree diet per speech.       Problem: Fall Risk  Goal: Patient will remain free from falls  Outcome: Progressing     Problem: Psychosocial  Goal: Patient's level of anxiety will decrease  Outcome: Progressing     Patient is not progressing towards the following goals:

## 2022-04-07 NOTE — PROGRESS NOTES
Pt arrived via gurney with O2 @ 1 liter. He is alert and oriented x4. Is complaining of dizziness. Also complains of pain level 5/10 assisted patient at edge of bed voided 300 clear yellow urine into urinal. No flatus at this time.    Sequential Teds applied to bilateral lower extremities and turned on.    Pt left with side rails up x2, bed low and locked, call light within reach and bedframe alarm on; will continue to monitor patient intermittently.      DEB Mendoza RN

## 2022-04-08 ENCOUNTER — APPOINTMENT (OUTPATIENT)
Dept: RADIOLOGY | Facility: MEDICAL CENTER | Age: 37
DRG: 326 | End: 2022-04-08
Attending: STUDENT IN AN ORGANIZED HEALTH CARE EDUCATION/TRAINING PROGRAM
Payer: MEDICAID

## 2022-04-08 ENCOUNTER — PHARMACY VISIT (OUTPATIENT)
Dept: PHARMACY | Facility: MEDICAL CENTER | Age: 37
End: 2022-04-08
Payer: COMMERCIAL

## 2022-04-08 VITALS
BODY MASS INDEX: 14.8 KG/M2 | WEIGHT: 119.05 LBS | SYSTOLIC BLOOD PRESSURE: 99 MMHG | TEMPERATURE: 98 F | HEART RATE: 58 BPM | RESPIRATION RATE: 17 BRPM | OXYGEN SATURATION: 95 % | HEIGHT: 75 IN | DIASTOLIC BLOOD PRESSURE: 70 MMHG

## 2022-04-08 LAB
ANION GAP SERPL CALC-SCNC: 8 MMOL/L (ref 7–16)
BASOPHILS # BLD AUTO: 0.7 % (ref 0–1.8)
BASOPHILS # BLD: 0.05 K/UL (ref 0–0.12)
BUN SERPL-MCNC: 4 MG/DL (ref 8–22)
CALCIUM SERPL-MCNC: 9 MG/DL (ref 8.5–10.5)
CHLORIDE SERPL-SCNC: 104 MMOL/L (ref 96–112)
CO2 SERPL-SCNC: 29 MMOL/L (ref 20–33)
CREAT SERPL-MCNC: 0.8 MG/DL (ref 0.5–1.4)
EOSINOPHIL # BLD AUTO: 0.28 K/UL (ref 0–0.51)
EOSINOPHIL NFR BLD: 3.7 % (ref 0–6.9)
ERYTHROCYTE [DISTWIDTH] IN BLOOD BY AUTOMATED COUNT: 39.1 FL (ref 35.9–50)
GFR SERPLBLD CREATININE-BSD FMLA CKD-EPI: 118 ML/MIN/1.73 M 2
GLUCOSE SERPL-MCNC: 94 MG/DL (ref 65–99)
HCT VFR BLD AUTO: 33.2 % (ref 42–52)
HGB BLD-MCNC: 10.9 G/DL (ref 14–18)
IMM GRANULOCYTES # BLD AUTO: 0.02 K/UL (ref 0–0.11)
IMM GRANULOCYTES NFR BLD AUTO: 0.3 % (ref 0–0.9)
LYMPHOCYTES # BLD AUTO: 2.46 K/UL (ref 1–4.8)
LYMPHOCYTES NFR BLD: 32.5 % (ref 22–41)
MCH RBC QN AUTO: 29.6 PG (ref 27–33)
MCHC RBC AUTO-ENTMCNC: 32.8 G/DL (ref 33.7–35.3)
MCV RBC AUTO: 90.2 FL (ref 81.4–97.8)
MONOCYTES # BLD AUTO: 0.87 K/UL (ref 0–0.85)
MONOCYTES NFR BLD AUTO: 11.5 % (ref 0–13.4)
NEUTROPHILS # BLD AUTO: 3.88 K/UL (ref 1.82–7.42)
NEUTROPHILS NFR BLD: 51.3 % (ref 44–72)
NRBC # BLD AUTO: 0 K/UL
NRBC BLD-RTO: 0 /100 WBC
PLATELET # BLD AUTO: 264 K/UL (ref 164–446)
PMV BLD AUTO: 8.9 FL (ref 9–12.9)
POTASSIUM SERPL-SCNC: 3.7 MMOL/L (ref 3.6–5.5)
RBC # BLD AUTO: 3.68 M/UL (ref 4.7–6.1)
SODIUM SERPL-SCNC: 141 MMOL/L (ref 135–145)
WBC # BLD AUTO: 7.6 K/UL (ref 4.8–10.8)

## 2022-04-08 PROCEDURE — RXMED WILLOW AMBULATORY MEDICATION CHARGE: Performed by: STUDENT IN AN ORGANIZED HEALTH CARE EDUCATION/TRAINING PROGRAM

## 2022-04-08 PROCEDURE — A9270 NON-COVERED ITEM OR SERVICE: HCPCS | Performed by: STUDENT IN AN ORGANIZED HEALTH CARE EDUCATION/TRAINING PROGRAM

## 2022-04-08 PROCEDURE — 700102 HCHG RX REV CODE 250 W/ 637 OVERRIDE(OP): Performed by: STUDENT IN AN ORGANIZED HEALTH CARE EDUCATION/TRAINING PROGRAM

## 2022-04-08 PROCEDURE — 80048 BASIC METABOLIC PNL TOTAL CA: CPT

## 2022-04-08 PROCEDURE — 85025 COMPLETE CBC W/AUTO DIFF WBC: CPT

## 2022-04-08 PROCEDURE — 700105 HCHG RX REV CODE 258: Performed by: INTERNAL MEDICINE

## 2022-04-08 PROCEDURE — A9270 NON-COVERED ITEM OR SERVICE: HCPCS | Performed by: INTERNAL MEDICINE

## 2022-04-08 PROCEDURE — 99239 HOSP IP/OBS DSCHRG MGMT >30: CPT | Performed by: STUDENT IN AN ORGANIZED HEALTH CARE EDUCATION/TRAINING PROGRAM

## 2022-04-08 PROCEDURE — 700102 HCHG RX REV CODE 250 W/ 637 OVERRIDE(OP): Performed by: INTERNAL MEDICINE

## 2022-04-08 RX ORDER — ONDANSETRON 4 MG/1
4 TABLET, ORALLY DISINTEGRATING ORAL EVERY 6 HOURS PRN
Qty: 20 TABLET | Refills: 1 | Status: SHIPPED | OUTPATIENT
Start: 2022-04-08

## 2022-04-08 RX ORDER — SUCRALFATE ORAL 1 G/10ML
1 SUSPENSION ORAL EVERY 6 HOURS
Qty: 400 ML | Refills: 0 | Status: SHIPPED | OUTPATIENT
Start: 2022-04-08 | End: 2022-04-18

## 2022-04-08 RX ORDER — ONDANSETRON 4 MG/1
4 TABLET, ORALLY DISINTEGRATING ORAL EVERY 6 HOURS PRN
Qty: 20 TABLET | Refills: 1 | Status: SHIPPED | OUTPATIENT
Start: 2022-04-08 | End: 2022-04-08 | Stop reason: SDUPTHER

## 2022-04-08 RX ADMIN — SUCRALFATE 1 G: 1 SUSPENSION ORAL at 00:00

## 2022-04-08 RX ADMIN — SENNOSIDES AND DOCUSATE SODIUM 2 TABLET: 50; 8.6 TABLET ORAL at 06:24

## 2022-04-08 RX ADMIN — SUCRALFATE 1 G: 1 SUSPENSION ORAL at 06:23

## 2022-04-08 RX ADMIN — OXYCODONE HYDROCHLORIDE 5 MG: 5 TABLET ORAL at 06:23

## 2022-04-08 RX ADMIN — SCOPALAMINE 1 PATCH: 1 PATCH, EXTENDED RELEASE TRANSDERMAL at 00:04

## 2022-04-08 RX ADMIN — SODIUM CHLORIDE: 9 INJECTION, SOLUTION INTRAVENOUS at 05:20

## 2022-04-08 RX ADMIN — OMEPRAZOLE 20 MG: 20 CAPSULE, DELAYED RELEASE ORAL at 06:23

## 2022-04-08 RX ADMIN — Medication 1000 UNITS: at 06:23

## 2022-04-08 ASSESSMENT — ENCOUNTER SYMPTOMS
HEARTBURN: 0
CHILLS: 0
FEVER: 0
COUGH: 0
SHORTNESS OF BREATH: 0
NAUSEA: 1
ABDOMINAL PAIN: 1
PALPITATIONS: 0
VOMITING: 0
DIARRHEA: 0

## 2022-04-08 ASSESSMENT — COGNITIVE AND FUNCTIONAL STATUS - GENERAL
MOBILITY SCORE: 21
STANDING UP FROM CHAIR USING ARMS: A LITTLE
HELP NEEDED FOR BATHING: A LITTLE
SUGGESTED CMS G CODE MODIFIER DAILY ACTIVITY: CI
SUGGESTED CMS G CODE MODIFIER MOBILITY: CJ
DAILY ACTIVITIY SCORE: 23
WALKING IN HOSPITAL ROOM: A LITTLE
CLIMB 3 TO 5 STEPS WITH RAILING: A LITTLE

## 2022-04-08 ASSESSMENT — PAIN DESCRIPTION - PAIN TYPE: TYPE: ACUTE PAIN

## 2022-04-08 NOTE — DISCHARGE INSTRUCTIONS
Discharge Instructions    Discharged to home by car with relative. Discharged via wheelchair, hospital escort: Yes.  Special equipment needed: Not Applicable    Be sure to schedule a follow-up appointment with your primary care doctor or any specialists as instructed.     Discharge Plan:        I understand that a diet low in cholesterol, fat, and sodium is recommended for good health. Unless I have been given specific instructions below for another diet, I accept this instruction as my diet prescription.   Other diet: as tolerated    Special Instructions: None    · Is patient discharged on Warfarin / Coumadin?   No     Depression / Suicide Risk    As you are discharged from this Critical access hospital facility, it is important to learn how to keep safe from harming yourself.    Recognize the warning signs:  · Abrupt changes in personality, positive or negative- including increase in energy   · Giving away possessions  · Change in eating patterns- significant weight changes-  positive or negative  · Change in sleeping patterns- unable to sleep or sleeping all the time   · Unwillingness or inability to communicate  · Depression  · Unusual sadness, discouragement and loneliness  · Talk of wanting to die  · Neglect of personal appearance   · Rebelliousness- reckless behavior  · Withdrawal from people/activities they love  · Confusion- inability to concentrate     If you or a loved one observes any of these behaviors or has concerns about self-harm, here's what you can do:  · Talk about it- your feelings and reasons for harming yourself  · Remove any means that you might use to hurt yourself (examples: pills, rope, extension cords, firearm)  · Get professional help from the community (Mental Health, Substance Abuse, psychological counseling)  · Do not be alone:Call your Safe Contact- someone whom you trust who will be there for you.  · Call your local CRISIS HOTLINE 995-1493 or 494-832-2007  · Call your local Children's Mobile  Crisis Response Team [place] (430) 624-0324 or www.[employer]  · Call the toll free National Suicide Prevention Hotlines   · National Suicide Prevention Lifeline 037-190-LPKE (6672)  · [facility] Line Network 800-SUICIDE (737-7195)    Laparoscopic Nissen Fundoplication    Laparoscopic Nissen fundoplication is a surgery to relieve heartburn and other problems caused by gastric fluids flowing up into your esophagus. The esophagus is the part of the body that moves food from your mouth to your stomach. Normally, the muscle that sits between your stomach and your esophagus (lower esophageal sphincter, LES) keeps stomach fluids in your stomach.  In some people, the LES does not work properly, and stomach fluids flow up into the esophagus. This can happen when part of the stomach bulges through the LES (hiatal hernia). The backward flow of stomach fluids can cause a type of severe and long-lasting heartburn that is called gastroesophageal reflux disease (GERD). You may need this surgery if other treatments for GERD have not helped.  Tell a health care provider about:  · Any allergies you have.  · All medicines you are taking, including vitamins, herbs, eye drops, creams, and over-the-counter medicines.  · Any problems you or family members have had with anesthetic medicines.  · Any blood disorders you have.  · Any surgeries you have had.  · Any medical conditions you have.  · Whether you are pregnant or may be pregnant.  What are the risks?  Generally, this is a safe procedure. However, problems may occur, including:  · Infection.  · Bleeding.  · Damage to other structures or organs. This can include damage to the lung, causing a collapsed lung.  · Trouble swallowing (dysphagia).  · Blood clots.  What happens before the procedure?  Medicines  · Ask your health care provider about:  ? Changing or stopping your regular medicines. This is especially important if you are taking diabetes medicines or blood  thinners.  ? Taking medicines such as aspirin and ibuprofen. These medicines can thin your blood. Do not take these medicines unless your health care provider tells you to take them.  ? Taking over-the-counter medicines, vitamins, herbs, and supplements.  Staying hydrated  Follow instructions from your health care provider about hydration, which may include:  · Up to 2 hours before the procedure - you may continue to drink clear liquids, such as water, clear fruit juice, black coffee, and plain tea.  Eating and drinking restrictions  Follow instructions from your health care provider about eating and drinking, which may include:  · 8 hours before the procedure - stop eating heavy meals or foods such as meat, fried foods, or fatty foods.  · 6 hours before the procedure - stop eating light meals or foods, such as toast or cereal.  · 6 hours before the procedure - stop drinking milk or drinks that contain milk.  · 2 hours before the procedure - stop drinking clear liquids.  General instructions  · Plan to have someone take you home from the hospital or clinic.  · Ask your health care provider what steps will be taken to help prevent infection. These may include:  ? Removing hair at the surgery site.  ? Washing skin with a germ-killing soap.  What happens during the procedure?  · An IV will be inserted into one of your veins.  · You will be given a medicine to make you fall asleep (general anesthetic).  · The surgeon will make a small incision in your abdomen and insert a tube through the incision.  · Your abdomen will be filled with a gas. This helps the surgeon see your organs better, and it makes more space to work.  · The surgeon will insert a thin, lighted tube (laparoscope) through the small incision. This allows your surgeon to see into your abdomen.  · The surgeon will make several other small incisions in your abdomen to insert the other instruments that are needed during the procedure.  · Another instrument  (dilator) will be passed through your mouth and down your esophagus into the upper part of your stomach. The dilator will prevent your LES from being closed too tightly during surgery.  · The upper part of your stomach will be wrapped around the lower part of your esophagus and will be stitched into place. This will strengthen the lower esophageal sphincter and prevent reflux.  · If you have a hiatal hernia, it will be repaired.  · The gas will be released from your abdomen.  · All instruments will be removed, and the incisions will be closed with stitches (sutures).  · A bandage (dressing) will be placed on your skin over the incisions.  The procedure may vary among health care providers and hospitals.  What happens after the procedure?  · Your blood pressure, heart rate, breathing rate, and blood oxygen level will be monitored until you leave the hospital or clinic.  · You will be given pain medicine as needed.  · Your IV will be kept in until you are able to drink fluids.  · You will be encouraged to get up and walk around as soon as possible.  Summary  · Laparoscopic Nissen fundoplication is a surgery to relieve heartburn and other problems caused by gastric fluids flowing up into your esophagus.  · You may need this surgery if other treatments for GERD have not helped.  · Follow instructions from your health care provider about eating and drinking before the procedure.  · Your surgeon will use a thin, lighted tube (laparoscope) that is inserted through a small incision, allowing the surgeon to see into your abdomen.  This information is not intended to replace advice given to you by your health care provider. Make sure you discuss any questions you have with your health care provider.  Document Released: 01/08/2016 Document Revised: 02/22/2019 Document Reviewed: 01/16/2019  Elsevier Patient Education © 2020 [employer]

## 2022-04-08 NOTE — PROGRESS NOTES
D/c instructions given, educated on worsening s/s. Pt understands and questions answered. Iv removed, meds to beds delivered. D/c to home with brother

## 2022-04-08 NOTE — PROGRESS NOTES
"Surgical Progress Note    Author: Kamila Borges P.A.-C. Date & Time created: 2022   8:35 AM     Interval Events:  POD#2 Laparoscopic examination of recent paraesophageal hiatal hernia repair, Release of fundoplication, Esophagogastric Dilation to 60 Setswana.  He reports a \"night and day\" difference since the release of the fundoplication. He is able to swallow pureed GI soft diet without difficulty. Speech swallow eval yesterday recommended pureed foods and thin liquids ok. Speech also recommends Esophagram study. Pt scheduled to have Barium swallow today. Pt reports a little nausea after eating, controlled with antinausea medication. He denies vomiting. Admits to typical incisional abdominal pain, controlled with medication. Pt is ambulating some and he is voiding.     Review of Systems   Constitutional: Negative for chills and fever.   Respiratory: Negative for cough and shortness of breath.    Cardiovascular: Negative for chest pain and palpitations.   Gastrointestinal: Positive for abdominal pain (incisional) and nausea (little nausea after eating). Negative for diarrhea, heartburn and vomiting.   Genitourinary: Negative for dysuria.     Hemodynamics:  Temp (24hrs), Av.8 °C (98.2 °F), Min:36.2 °C (97.1 °F), Max:37.5 °C (99.5 °F)  Temperature: 36.7 °C (98 °F)  Pulse  Av.1  Min: 58  Max: 102   Blood Pressure: (!) 99/70 (nurse aware)     Respiratory:    Respiration: 17, Pulse Oximetry: 95 %           Neuro:  GCS       Fluids:    Intake/Output Summary (Last 24 hours) at 2022 0835  Last data filed at 2022  Gross per 24 hour   Intake 290 ml   Output --   Net 290 ml        Current Diet Order   Procedures   • Diet Order Diet: Level 4 - Pureed (low fiber (GI soft)); Liquid level: Level 0 - Thin; Miscellaneous modifications: (optional): Lactose Free     Physical Exam  HENT:      Head: Normocephalic and atraumatic.      Mouth/Throat:      Pharynx: Oropharynx is clear.   Eyes:      " Conjunctiva/sclera: Conjunctivae normal.   Cardiovascular:      Rate and Rhythm: Normal rate and regular rhythm.   Pulmonary:      Effort: Pulmonary effort is normal.   Abdominal:      General: There is no distension.      Palpations: Abdomen is soft. There is no mass.      Tenderness: There is abdominal tenderness (incisional). There is no guarding or rebound.   Musculoskeletal:         General: Normal range of motion.      Cervical back: Normal range of motion.   Skin:     General: Skin is warm and dry.      Findings: No erythema or rash.      Comments: Incisions with minimal serosanguinous ooze   Neurological:      Mental Status: He is alert and oriented to person, place, and time.   Psychiatric:         Mood and Affect: Mood normal.       Labs:  Recent Results (from the past 24 hour(s))   CBC WITH DIFFERENTIAL    Collection Time: 04/08/22  1:48 AM   Result Value Ref Range    WBC 7.6 4.8 - 10.8 K/uL    RBC 3.68 (L) 4.70 - 6.10 M/uL    Hemoglobin 10.9 (L) 14.0 - 18.0 g/dL    Hematocrit 33.2 (L) 42.0 - 52.0 %    MCV 90.2 81.4 - 97.8 fL    MCH 29.6 27.0 - 33.0 pg    MCHC 32.8 (L) 33.7 - 35.3 g/dL    RDW 39.1 35.9 - 50.0 fL    Platelet Count 264 164 - 446 K/uL    MPV 8.9 (L) 9.0 - 12.9 fL    Neutrophils-Polys 51.30 44.00 - 72.00 %    Lymphocytes 32.50 22.00 - 41.00 %    Monocytes 11.50 0.00 - 13.40 %    Eosinophils 3.70 0.00 - 6.90 %    Basophils 0.70 0.00 - 1.80 %    Immature Granulocytes 0.30 0.00 - 0.90 %    Nucleated RBC 0.00 /100 WBC    Neutrophils (Absolute) 3.88 1.82 - 7.42 K/uL    Lymphs (Absolute) 2.46 1.00 - 4.80 K/uL    Monos (Absolute) 0.87 (H) 0.00 - 0.85 K/uL    Eos (Absolute) 0.28 0.00 - 0.51 K/uL    Baso (Absolute) 0.05 0.00 - 0.12 K/uL    Immature Granulocytes (abs) 0.02 0.00 - 0.11 K/uL    NRBC (Absolute) 0.00 K/uL   Basic Metabolic Panel    Collection Time: 04/08/22  1:48 AM   Result Value Ref Range    Sodium 141 135 - 145 mmol/L    Potassium 3.7 3.6 - 5.5 mmol/L    Chloride 104 96 - 112 mmol/L     Co2 29 20 - 33 mmol/L    Glucose 94 65 - 99 mg/dL    Bun 4 (L) 8 - 22 mg/dL    Creatinine 0.80 0.50 - 1.40 mg/dL    Calcium 9.0 8.5 - 10.5 mg/dL    Anion Gap 8.0 7.0 - 16.0   ESTIMATED GFR    Collection Time: 04/08/22  1:48 AM   Result Value Ref Range    GFR (CKD-EPI) 118 >60 mL/min/1.73 m 2     Medical Decision Making, by Problem:  Active Hospital Problems    Diagnosis    • Severe protein-calorie malnutrition (HCC) [E43]    • Abdominal pain [R10.9]      Plan:  Pt is alert and oriented, NAD. Breathing unlabored. Tolerating pureed diet.  Incisions ok. VS stable. Labs reviewed. No Leukocytosis, likely reactive. Small drop in H&H, likely dilutional. Encouraged ambulation and incentive spirometry. DVT prophylaxis Enoxaparin restarted. From surgical standpoint, pt okay for discharge home after Barium swallow study. He should follow up in our office in 1-2 weeks. Discussed with Dr. Carreon.      Quality Measures:  Quality-Core Measures   Reviewed items::  Labs reviewed  Vieyra catheter::  No Vieyra  DVT prophylaxis pharmacological::  Enoxaparin (Lovenox)  DVT prophylaxis - mechanical:  SCDs  Ulcer Prophylaxis::  Yes      Discussed patient condition with RN, Patient and Dr. Carreon

## 2022-04-08 NOTE — PROGRESS NOTES
Received report from [facility] nurse and assumed care of patient. Patient A&Ox 4, sitting in bed. Patient complains of pain in abdomen presently 6/10, intervention declined at this time. Bed is locked and in lowest position, with call light in reach.

## 2022-04-08 NOTE — DISCHARGE SUMMARY
Discharge Summary    CHIEF COMPLAINT ON ADMISSION  Chief Complaint   Patient presents with   • Abdominal Pain       Reason for Admission  post op comp     Admission Date  4/4/2022    CODE STATUS  Full Code    HPI & HOSPITAL COURSE  This is a 36 y.o. male here with abdominal pain  Sudeep West is a 36 y.o. male with past medical history of hiatal hernia, GERD, status post Nissen fundoplication 3/16 by Dr. Carreon who presented 4/4/2022 with upper abdominal pain, nausea, vomiting that has been going on ever since that surgery, weight loss 14 pounds, generalized weakness.  He has only been able to tolerate liquids such or Powerade to water, while solid food causing the pain in the chest and in the upper stomach, sharp and dull and burning, without alleviating aggravating factors.  He would vomit immediately after eating or drinking or a few minutes later.  After surgery he was readmitted on 3/18 with the same symptoms, was evaluated with CT of the abdomen and pelvis showing postsurgical changes, and upper GI follow-through studies, showing some delayed gastric emptying.  ERP spoke to Dr Carreon, who did not recommend any additional surgery at this time.  He agreed to see the patient tomorrow morning after admission to the hospital.  Patient states that he was seen at [facility] emergency department last Wednesday, where she had CT of the abdomen pelvis repeated, reportedly showing postsurgical changes, as well as some small lesions in the liver and spleen, and some fluid in the pelvis.  I offered him to repeat CT of the abdomen pelvis tonight, but he would like to wait for consultation of Dr Carreon.  I discussed the plan of care with patient.    Patient underwent release of Nissen fundoplication on 4/6/2022 and tolerated procedure well.  Patient had immediate improvement in his symptomatology of abdominal pain difficulty swallowing in addition to anorexia.  Patient was evaluated by dietary and also speech  therapy recommendations were appreciated.  Patient was tolerating soft diet on day of discharge.  Furthermore patient was provided with pain medication from surgical service in addition provided antiemetic medication on day of discharge.  Patient had antiemetic medication in hand prior to discharge.    Patient will have close follow-up with his primary care provider as indicated below he already has an appointment set up for this  Furthermore patient will follow up with surgical service and Dr. Zamora as scheduled by his office.  Patient was ambulating with walker on day of discharge.      Therefore, he is discharged in good and stable condition to home with close outpatient follow-up.    The patient met 2-midnight criteria for an inpatient stay at the time of discharge.    Discharge Date  4/8/2022    FOLLOW UP ITEMS POST DISCHARGE  Take all medication prescribed  Got all follow-up ointments as indicated  Advance diet slowly as tolerated    DISCHARGE DIAGNOSES  Principal Problem:    Abdominal pain POA: Yes  Active Problems:    Severe protein-calorie malnutrition (HCC) POA: Unknown  Resolved Problems:    * No resolved hospital problems. *      FOLLOW UP    Amy Renae D.O.  [address]  [facility] [address] [phone]    Go in 2 weeks  Post hospital discharge    Surendra Carreon M.D.  [address]  [facility] [address] [phone]    Schedule an appointment as soon as possible for a visit in 2 weeks  As needed post hospital discharge      MEDICATIONS ON DISCHARGE     Medication List      START taking these medications      Instructions   HYDROcodone-acetaminophen 2.5-108 mg/5mL 7.5-325 MG/15ML solution  Commonly known as: HYCET   Take 10-15 mL by mouth every four hours as needed for Severe Pain for up to 3 days.  Dose: 10-15 mL     sucralfate 1 GM/10ML Susp  Commonly known as: CARAFATE   Take 10 mL by mouth every 6 hours for 10 days.  Dose: 1 g        CONTINUE taking these medications       Instructions   acetaminophen 160 MG/5ML Susp  Commonly known as: TYLENOL   Take 30 mL by mouth every four hours as needed.  Dose: 30 mL     gabapentin 300 MG Caps  Commonly known as: NEURONTIN   Take 300 mg by mouth at bedtime.  Dose: 300 mg     hyoscyamine 0.125 MG tablet  Commonly known as: LEVSIN   Take 125 mcg by mouth every four hours as needed for Cramping.  Dose: 125 mcg     omeprazole 20 MG delayed-release capsule  Commonly known as: PRILOSEC   Take 20 mg by mouth every day.  Dose: 20 mg     ondansetron 4 MG Tbdp  Commonly known as: Zofran ODT   Dissolve 1 Tablet by mouth every 6 hours as needed for Nausea.  Dose: 4 mg     SCOPOLAMINE TD   Place 1 Patch on the skin every 72 hours as needed.  Dose: 1 Patch     vitamin D3 1000 Unit (25 mcg) Tabs  Commonly known as: cholecalciferol   Take 1,000 Units by mouth every morning.  Dose: 1,000 Units            Allergies  Allergies   Allergen Reactions   • Sulfa Drugs Anaphylaxis   • Lactose Diarrhea     Frequent   • Nsaids Nausea       DIET  Orders Placed This Encounter   Procedures   • Diet Order Diet: Level 4 - Pureed (low fiber (GI soft)); Liquid level: Level 0 - Thin; Miscellaneous modifications: (optional): Lactose Free     Standing Status:   Standing     Number of Occurrences:   1     Order Specific Question:   Diet:     Answer:   Level 4 - Pureed [25]     Comments:   low fiber (GI soft)     Order Specific Question:   Liquid level     Answer:   Level 0 - Thin     Order Specific Question:   Miscellaneous modifications: (optional)     Answer:   Lactose Free [5]       ACTIVITY  As tolerated.  Weight bearing as tolerated    CONSULTATIONS  General surgery-Dr. Carreon    PROCEDURES  4/6/2022:  OPERATION PERFORMED:   1. Laparoscopic examination of recent paraesophageal hiatal hernia repair.   2. Release of fundoplication   3. Esophagogastric Dilation to 60 French    LABORATORY  Lab Results   Component Value Date    SODIUM 141 04/08/2022    POTASSIUM 3.7 04/08/2022     CHLORIDE 104 04/08/2022    CO2 29 04/08/2022    GLUCOSE 94 04/08/2022    BUN 4 (L) 04/08/2022    CREATININE 0.80 04/08/2022        Lab Results   Component Value Date    WBC 7.6 04/08/2022    HEMOGLOBIN 10.9 (L) 04/08/2022    HEMATOCRIT 33.2 (L) 04/08/2022    PLATELETCT 264 04/08/2022        Total time of the discharge process exceeds 35 minutes.

## 2022-04-08 NOTE — DISCHARGE PLANNING
Anticipated Discharge Disposition: Home, self care     Action: pt has active discharge order, pt currently on 0 liters O2, 6 clicks are 23/16. Orientation: A&Ox4. Patient discussed in IDT rounds. DC home today with no CM dc planning needs.      Barriers to Discharge: None     Plan: DC home independently

## 2023-10-27 NOTE — CARE PLAN
The patient is Stable - Low risk of patient condition declining or worsening    Shift Goals  Clinical Goals: Pt. will have less anxiety    Progress made toward(s) clinical / shift goals:  Plan for procedure tomorrow, pt. Aware of pending test      Problem: Psychosocial  Goal: Patient's level of anxiety will decrease  Outcome: Progressing      N/A

## (undated) DEVICE — SET EXTENSION WITH 2 PORTS (48EA/CA) ***PART #2C8610 IS A SUBSTITUTE*****

## (undated) DEVICE — GLOVE BIOGEL PI INDICATOR SZ 7.5 SURGICAL PF LF -(50/BX 4BX/CA)

## (undated) DEVICE — KIT ANESTHESIA W/CIRCUIT & 3/LT BAG W/FILTER (20EA/CA)

## (undated) DEVICE — ENDOSTITCH10MM SUTURING DEVIC - (3/CA)

## (undated) DEVICE — ENDOSTITCH LOAD UNIT 0 SURGI - 12/CA

## (undated) DEVICE — SUTURE 4-0 VICRYL PLUSFS-1 - 27 INCH (36/BX)

## (undated) DEVICE — TUBING CLEARLINK DUO-VENT - C-FLO (48EA/CA)

## (undated) DEVICE — CHLORAPREP 26 ML APPLICATOR - ORANGE TINT(25/CA)

## (undated) DEVICE — MASK ANESTHESIA ADULT  - (100/CA)

## (undated) DEVICE — ELECTRODE DUAL RETURN W/ CORD - (50/PK)

## (undated) DEVICE — SUCTION INSTRUMENT YANKAUER BULBOUS TIP W/O VENT (50EA/CA)

## (undated) DEVICE — GLOVE BIOGEL PI INDICATOR SZ 6.5 SURGICAL PF LF - (50/BX 4BX/CA)

## (undated) DEVICE — PACK GASTRIC BANDING OR - (1/CA)

## (undated) DEVICE — TROCAR Z THREAD12MM OPTICAL - NON BLADED (6/BX)

## (undated) DEVICE — ELECTRODE 850 FOAM ADHESIVE - HYDROGEL RADIOTRNSPRNT (50/PK)

## (undated) DEVICE — BANDAID SHEER STRIP 3/4 IN (100EA/BX 12BX/CA)

## (undated) DEVICE — GLOVE SZ 6 BIOGEL PI MICRO - PF LF (50PR/BX 4BX/CA)

## (undated) DEVICE — SENSOR SPO2 NEO LNCS ADHESIVE (20/BX) SEE USER NOTES

## (undated) DEVICE — HEAD HOLDER JUNIOR/ADULT

## (undated) DEVICE — CANISTER SUCTION 3000ML MECHANICAL FILTER AUTO SHUTOFF MEDI-VAC NONSTERILE LF DISP  (40EA/CA)

## (undated) DEVICE — PROTECTOR ULNA NERVE - (36PR/CA)

## (undated) DEVICE — TOWEL STOP TIMEOUT SAFETY FLAG (40EA/CA)

## (undated) DEVICE — SODIUM CHL IRRIGATION 0.9% 1000ML (12EA/CA)

## (undated) DEVICE — SET TUBING PNEUMOCLEAR HIGH FLOW SMOKE EVACUATION (10EA/BX)

## (undated) DEVICE — SET SUCTION/IRRIGATION WITH DISPOSABLE TIP (6/CA )PART #0250-070-520 IS A SUB

## (undated) DEVICE — DRAPESURG STERI-DRAPE LONG - (10/BX 4BX/CA)

## (undated) DEVICE — GLOVE SZ 7 BIOGEL PI MICRO - PF LF (50PR/BX 4BX/CA)

## (undated) DEVICE — SCISSORS 5MM CVD (6EA/BX)

## (undated) DEVICE — CANNULA W/SEAL 5X100 Z-THRE - ADED KII (12/BX)

## (undated) DEVICE — SET LEADWIRE 5 LEAD BEDSIDE DISPOSABLE ECG (1SET OF 5/EA)

## (undated) DEVICE — NEPTUNE 4 PORT MANIFOLD - (20/PK)

## (undated) DEVICE — TROCAR 5X100 NON BLADED Z-TH - READ KII (6/BX)

## (undated) DEVICE — SLEEVE, VASO, THIGH, MED

## (undated) DEVICE — CLIP APPLIER 10MM ENDO LARGE (3EA/BX)

## (undated) DEVICE — CLOSURE SKIN STRIP 1/2 X 4 IN - (STERI STRIP) (50/BX 4BX/CA)

## (undated) DEVICE — SUTURE GENERAL

## (undated) DEVICE — LACTATED RINGERS INJ 1000 ML - (14EA/CA 60CA/PF)

## (undated) DEVICE — ENDOSTITCH LOAD UNIT 2-0 POLY (12EA/CA)

## (undated) DEVICE — SEALER TISSUE CURVED ENSEAL X1

## (undated) DEVICE — STERI STRIP COMPOUND BENZOIN - TINCTURE 0.6ML WITH APPLICATOR (40EA/BX)

## (undated) DEVICE — GOWN WARMING STANDARD FLEX - (30/CA)